# Patient Record
Sex: FEMALE | Race: WHITE | NOT HISPANIC OR LATINO | Employment: FULL TIME | URBAN - METROPOLITAN AREA
[De-identification: names, ages, dates, MRNs, and addresses within clinical notes are randomized per-mention and may not be internally consistent; named-entity substitution may affect disease eponyms.]

---

## 2017-08-28 ENCOUNTER — TRANSFERRED RECORDS (OUTPATIENT)
Dept: SURGERY | Facility: CLINIC | Age: 32
End: 2017-08-28

## 2017-12-17 ENCOUNTER — HEALTH MAINTENANCE LETTER (OUTPATIENT)
Age: 32
End: 2017-12-17

## 2018-01-03 ENCOUNTER — OFFICE VISIT (OUTPATIENT)
Dept: SURGERY | Facility: CLINIC | Age: 33
End: 2018-01-03
Payer: COMMERCIAL

## 2018-01-03 VITALS
OXYGEN SATURATION: 97 % | HEIGHT: 66 IN | SYSTOLIC BLOOD PRESSURE: 124 MMHG | RESPIRATION RATE: 16 BRPM | BODY MASS INDEX: 44.2 KG/M2 | WEIGHT: 275 LBS | DIASTOLIC BLOOD PRESSURE: 64 MMHG | HEART RATE: 95 BPM

## 2018-01-03 DIAGNOSIS — E04.1 RIGHT THYROID NODULE: Primary | ICD-10-CM

## 2018-01-03 DIAGNOSIS — E06.3 HASHIMOTO'S THYROIDITIS: Primary | ICD-10-CM

## 2018-01-03 PROCEDURE — 99203 OFFICE O/P NEW LOW 30 MIN: CPT | Performed by: SURGERY

## 2018-01-03 ASSESSMENT — ENCOUNTER SYMPTOMS: WEIGHT GAIN: 1

## 2018-01-03 NOTE — PROGRESS NOTES
"History of Present Illness  Daxa Wilkes is a 32 year old female self-referred for evaluation  regarding her Hashimoto's thyroiditis and fluctuating thyroid function.  Daxa has had thyroid issues for 5-7 years.  Initially hypothyroid and started on Levothyroxine.  Levels have been fluctuating and dose adjusted every 6 months.  Is now on 100 mcg daily.  Has never had imaging.  She feels poorly informed as to her thyroid issue.    She has symptoms of weight gain, dry skin, feeling down, weight loss and infertility issues.    She reports pressure neck discomfort.  She denies swallowing difficulty and hoarseness.    She does not have a family history of thyroid cancer.  She denies a personal history of radiation exposure.    Daxa is on thyroid medications.  Thyroid medications include: Synthroid (Levothyroxine).  Daxa has no prior neck surgery..      Past Medical History:   Diagnosis Date     Depressive disorder      GERD (gastroesophageal reflux disease)      HELLP syndrome, delivered, current hospitalization 7/13/2016     PUD (peptic ulcer disease)      Thyroid disease     hypo     Vaginal delivery 7/11/2016     History reviewed. No pertinent surgical history.    Smoking History:  has never smoked.    Review Of Systems:    Thyroid issue and weight gain.    Physical Exam:  /64  Pulse 95  Resp 16  Ht 5' 6\" (1.676 m)  Wt 275 lb (124.7 kg)  SpO2 97%  BMI 44.39 kg/m2  Well developed, well nourished female in no apparent distress.  HEENT:  Normocephalic, atraumatic.                   Eyes without exophthalmos.  Neck:   thick and symetrical.              Range of motion is normal.              No neck masses.  Thyroid:  Mildly enlarged, right has a subtle nodule, mid-portion.  Lymph:  No cervical adenopathy.  Respirations:  Unlabored.  Neurologic:  Alert.  Speech is clear.  Moves all extremities with good strength  Skin:  Warm and dry.  Psychologic:  Alert and appropriate range of emotions.    Imaging:  " None  Labs:  From early 2017, Normal free thyroxine, TSH is 4.58-5.53, Thyroid peroxidase 674.2 (H), Thyroglobulin Ab 4.7 (H), TSI 4.61 (H)  FNA Biopsy: NA    Assessment and Plan:    Daxa has Hashimoto's thyroiditis and a somewhat enlarged and asymmetrical gland.  Advised Daxa that Hashimoto's is usually a medical management issue, but some do come to surgery, usually for malignancy concerns.  I am recommending her for thyroid ultrasound for possible right thyroid nodule.  Could require biopsy if nodule seen and clinically indicated.  Will call her with ultrasound result and make the next plan.    Meeta Hilton MD  Please route to : Primary Care Provider (PCP)         35 minutes spent with patient, over half as counseling.

## 2018-01-03 NOTE — LETTER
"January 3, 2018    Re: Daxa Wilkes - 1985    Daxa Wilkes is a 32 year old female self-referred for evaluation  regarding her Hashimoto's thyroiditis and fluctuating thyroid function.  Daxa has had thyroid issues for 5-7 years.  Initially hypothyroid and started on Levothyroxine.  Levels have been fluctuating and dose adjusted every 6 months.  Is now on 100 mcg daily.  Has never had imaging.  She feels poorly informed as to her thyroid issue.     She has symptoms of weight gain, dry skin, feeling down, weight loss and infertility issues.     She reports pressure neck discomfort.  She denies swallowing difficulty and hoarseness.     She does not have a family history of thyroid cancer.  She denies a personal history of radiation exposure.     Daxa is on thyroid medications.  Thyroid medications include: Synthroid (Levothyroxine).  Daxa has no prior neck surgery..      Smoking History:  has never smoked.     Review Of Systems:    Thyroid issue and weight gain.     Physical Exam:  /64  Pulse 95  Resp 16  Ht 5' 6\" (1.676 m)  Wt 275 lb (124.7 kg)  SpO2 97%  BMI 44.39 kg/m2  Well developed, well nourished female in no apparent distress.  HEENT:  Normocephalic, atraumatic.                   Eyes without exophthalmos.  Neck:   thick and symetrical.              Range of motion is normal.              No neck masses.  Thyroid:  Mildly enlarged, right has a subtle nodule, mid-portion.  Lymph:  No cervical adenopathy.  Respirations:  Unlabored.  Neurologic:  Alert.  Speech is clear.  Moves all extremities with good strength  Skin:  Warm and dry.  Psychologic:  Alert and appropriate range of emotions.     Imaging:  None  Labs:  From early 2017, Normal free thyroxine, TSH is 4.58-5.53, Thyroid peroxidase 674.2 (H), Thyroglobulin Ab 4.7 (H), TSI 4.61 (H)  FNA Biopsy: NA     Assessment and Plan:    Daxa has Hashimoto's thyroiditis and a somewhat enlarged and asymmetrical gland.  Advised Daxa " that Hashimoto's is usually a medical management issue, but some do come to surgery, usually for malignancy concerns.  I am recommending her for thyroid ultrasound for possible right thyroid nodule.  Could require biopsy if nodule seen and clinically indicated.  Will call her with ultrasound result and make the next plan.     Meeta Hilton MD

## 2018-01-03 NOTE — PROGRESS NOTES
HPI      ROS (Review of Systems):      Positive for weight gain and thyroid problem.          Physical Exam

## 2018-01-03 NOTE — MR AVS SNAPSHOT
After Visit Summary   1/3/2018    Daxa Wilkes    MRN: [id number]           Patient Information     Date Of Birth          1985        Visit Information        Provider Department      1/3/2018 10:45 AM Meeta Hilton MD [facility] General Surgery       Follow-ups after your visit        Your next 10 appointments already scheduled     Jan 04, 2018 10:30 AM CST   US THYROID with SHUS1   [facility] Ultrasound ([facility])    [address] [facility] [id number] [phone]           Please bring a list of your medicines (including vitamins, minerals and over-the-counter drugs). Also, tell your doctor about any allergies you may have. Wear comfortable clothes and leave your valuables at home.  You do not need to do anything special to prepare for your exam.  Please call the Imaging Department at your exam site with any questions.            Jan 17, 2018  4:00 PM CST   New Visit with Marcy Nicholas MD   [facility] ([facility]    [address]  [facility] [id number] [phone]              Future tests that were ordered for you today     Open Future Orders        Priority Expected Expires Ordered    US Thyroid Routine 1/3/2018 1/3/2019 1/3/2018            Who to contact     If you have questions or need follow up information about today's clinic visit or your schedule please contact [facility] directly at [phone].  Normal or non-critical lab and imaging results will be communicated to you by MyChart, letter or phone within 4 business days after the clinic has received the results. If you do not hear from us within 7 days, please contact the clinic through MyChart or phone. If you have a critical or abnormal lab result, we will notify you by phone as soon as possible.  Submit refill requests through [facility] or call your  "pharmacy and they will forward the refill request to us. Please allow 3 business days for your refill to be completed.          Additional Information About Your Visit        MyChart Information     [employer] lets you send messages to your doctor, view your test results, renew your prescriptions, schedule appointments and more. To sign up, go to [email or web address] . Click on \"Log in\" on the left side of the screen, which will take you to the Welcome page. Then click on \"Sign up Now\" on the right side of the page.     You will be asked to enter the access code listed below, as well as some personal information. Please follow the directions to create your username and password.     Your access code is: [email or web address]  Expires: 4/3/2018 11:12 AM     Your access code will  in 90 days. If you need help or a new code, please call your [facility] clinic or [phone].        Care EveryWhere ID     This is your Care EveryWhere ID. This could be used by other organizations to access your [facility] medical records  [id number]        Your Vitals Were     Pulse Respirations Height Pulse Oximetry BMI (Body Mass Index)       95 16 5' 6\" (1.676 m) 97% 44.39 kg/m2        Blood Pressure from Last 3 Encounters:   18 124/64   16 139/81   16 116/71    Weight from Last 3 Encounters:   18 275 lb (124.7 kg)   11/27/15 230 lb (104.3 kg)   02/22/10 174 lb (78.9 kg)              Today, you had the following     No orders found for display       Primary Care Provider Office Phone # Fax #    Jennifer West PA-C [phone]       [address] [place] [address] [address]  [facility] [address]        Equal Access to Services     [facility] [place] BEST : Gaby Kang, waeladia verdin, qaybta kaalmanic aponte, sheri nicholas. [facility] [phone].    ATENCIÓN: Si habla español, tiene a resendez disposición servicios gratuitos de asistencia lingüística. Llame al " [phone].    We comply with applicable federal civil rights laws and [place] laws. We do not discriminate on the basis of race, color, national origin, age, disability, sex, sexual orientation, or gender identity.            Thank you!     Thank you for choosing [facility]  for your care. Our goal is always to provide you with excellent care. Hearing back from our patients is one way we can continue to improve our services. Please take a few minutes to complete the written survey that you may receive in the mail after your visit with us. Thank you!             Your Updated Medication List - Protect others around you: Learn how to safely use, store and throw away your medicines at www.disposemymeds.org.          This list is accurate as of: 1/3/18 11:12 AM.  Always use your most recent med list.                   Brand Name Dispense Instructions for use Diagnosis    albuterol 108 (90 BASE) MCG/ACT Inhaler    PROAIR HFA    1 Inhaler    Inhale 2 puffs into the lungs every 4 hours as needed for shortness of breath / dyspnea        CITALOPRAM HYDROBROMIDE PO      Take 40 mg by mouth daily        IRON SUPPLEMENT PO      Take 10 mg by mouth daily (with dinner)        levothyroxine 25 mcg/mL Susp    SYNTHROID     Take 50 mcg by mouth every morning (before breakfast)        prenatal multivitamin plus iron 27-0.8 MG Tabs per tablet      Take 1 tablet by mouth daily        RANITIDINE HCL PO      Take 150 mg by mouth 2 times daily

## 2018-01-04 ENCOUNTER — HOSPITAL ENCOUNTER (OUTPATIENT)
Dept: ULTRASOUND IMAGING | Facility: CLINIC | Age: 33
Discharge: HOME | End: 2018-01-04
Attending: SURGERY | Admitting: SURGERY
Payer: COMMERCIAL

## 2018-01-04 DIAGNOSIS — E04.1 RIGHT THYROID NODULE: ICD-10-CM

## 2018-01-04 PROCEDURE — 76536 US EXAM OF HEAD AND NECK: CPT

## 2018-01-09 ENCOUNTER — TELEPHONE (OUTPATIENT)
Dept: SURGERY | Facility: CLINIC | Age: 33
End: 2018-01-09

## 2018-01-09 NOTE — TELEPHONE ENCOUNTER
GENERAL SURGERY NURSE PHONE TRIAGE   Daxa Wilkes    MRN# [id number]  AGE:  32 year old  YOB: 1985  [phone]      Surgeon: Dr. Hilton  Thyroid consult 1/3/2018       CHIEF CONCERN:  Calling for  Thyroid US results     PLAN     Will message Dr. Hilton who will be in clinic tomorrow am (1/10/18)  Patient phone [phone] Okay to leave  with medical information.  Beata Ham RN

## 2019-07-25 ENCOUNTER — HOSPITAL ENCOUNTER (EMERGENCY)
Facility: CLINIC | Age: 34
Discharge: HOME | End: 2019-07-25
Attending: NURSE PRACTITIONER | Admitting: NURSE PRACTITIONER
Payer: COMMERCIAL

## 2019-07-25 VITALS
WEIGHT: 253 LBS | BODY MASS INDEX: 40.84 KG/M2 | SYSTOLIC BLOOD PRESSURE: 130 MMHG | HEART RATE: 129 BPM | TEMPERATURE: 98.4 F | RESPIRATION RATE: 18 BRPM | DIASTOLIC BLOOD PRESSURE: 75 MMHG | OXYGEN SATURATION: 96 %

## 2019-07-25 DIAGNOSIS — L02.91 ABSCESS: ICD-10-CM

## 2019-07-25 PROCEDURE — 99283 EMERGENCY DEPT VISIT LOW MDM: CPT | Mod: 25

## 2019-07-25 PROCEDURE — 10060 I&D ABSCESS SIMPLE/SINGLE: CPT

## 2019-07-25 RX ORDER — SULFAMETHOXAZOLE/TRIMETHOPRIM 800-160 MG
1 TABLET ORAL 2 TIMES DAILY
Qty: 20 TABLET | Refills: 0 | Status: ON HOLD | OUTPATIENT
Start: 2019-07-25 | End: 2019-07-30

## 2019-07-25 RX ORDER — LIDOCAINE HYDROCHLORIDE AND EPINEPHRINE 10; 10 MG/ML; UG/ML
INJECTION, SOLUTION INFILTRATION; PERINEURAL
Status: DISCONTINUED
Start: 2019-07-25 | End: 2019-07-26 | Stop reason: HOSPADM

## 2019-07-25 RX ORDER — BUPIVACAINE HYDROCHLORIDE 5 MG/ML
INJECTION, SOLUTION PERINEURAL
Status: DISCONTINUED
Start: 2019-07-25 | End: 2019-07-26 | Stop reason: HOSPADM

## 2019-07-25 ASSESSMENT — ENCOUNTER SYMPTOMS
FEVER: 0
NAUSEA: 0
DIZZINESS: 0
VOMITING: 0
WOUND: 1
CHILLS: 0

## 2019-07-25 NOTE — ED AVS SNAPSHOT
[facility] Emergency Department  [address]  [facility] [address]  Phone:  [phone]  Fax:  [phone]                                    Daxa Wilkes   MRN: [id number]    Department:  [facility] Emergency Department   Date of Visit:  7/25/2019           After Visit Summary Signature Page    I have received my discharge instructions, and my questions have been answered. I have discussed any challenges I see with this plan with the nurse or doctor.    ..........................................................................................................................................  Patient/Patient Representative Signature      ..........................................................................................................................................  Patient Representative Print Name and Relationship to Patient    ..................................................               ................................................  Date                                   Time    ..........................................................................................................................................  Reviewed by Signature/Title    ...................................................              ..............................................  Date                                               Time          22EPIC Rev 08/18

## 2019-07-26 NOTE — ED PROVIDER NOTES
History     Chief Complaint:  Wound Check    HPI   Daxa Wilkes is a 33 year old female who presents to the emergency department today for evaluation of a wound. The patient reports that she noted a red and painful area to the posterior right shoulder this morning. As her symptoms worsened throughout the day unrelieved with heat, she presents tonight for evaluation and treatment. Here the patient states that she is unsure of whether she could have obtained an insect bite. She denies any fever, chills, dizziness, nausea, or vomiting.      Allergies:  Penicillin g      Medications:    Albuterol  Citalopram  Synthroid  Iron  Ranitidine  Mirena  Effexor  Zoloft    Past Medical History:    HELLP syndrome  Gastroesophageal reflux disease  Peptic ulcer disease  Thyroid disease  Generalized anxiety disorder  Major depressive disorder  Heartburn  Irritable bowel syndrome  Pre-eclampsia    Past Surgical History:    [place] teeth extraction  Cyst removal  Laparoscopy  Knee surgery    Family History:    Father: thyroid disease, depression  Mother: anxiety  Brother: depression    Social History:  Smoking Status: Never Smoker  Smokeless Tobacco: Never Used  Alcohol Use: Negative  Drug Use: Negative  PCP: Jennifer West  Marital Status:        Review of Systems   Constitutional: Negative for chills and fever.   Gastrointestinal: Negative for nausea and vomiting.   Skin: Positive for wound.   Neurological: Negative for dizziness.   All other systems reviewed and are negative.      Physical Exam     Patient Vitals for the past 24 hrs:   BP Temp Temp src Pulse Heart Rate Resp SpO2 Weight   07/25/19 2241 130/75 -- -- -- -- -- 96 % --   07/25/19 2240 -- -- -- -- -- -- 96 % --   07/25/19 2100 (!) 134/91 -- -- -- -- -- 95 % --   07/25/19 2057 (!) 134/91 98.4  F (36.9  C) Oral -- 124 18 96 % --   07/25/19 2054 (!) 134/91 98.4  F (36.9  C) Oral 129 129 16 99 % 114.8 kg (253 lb)     Physical Exam  General: Alert, Mild  discomfort,  well kept, obese  HENT:  Normal voice, No lymphadenopathy  Eyes:  The pupils are equal, round, and reactive to light, Conjunctiva normal, No scleral icterus   Neck:  Normal range of motion  CV:  Normal Pulses  Resp:  Non-labored, No cough  MS:  Normal muscular tone, moves all extremities  Skin:  2 cm diameter area of tenderness and mild erythema, with a central area consistent with either recent drainage or excoriation.  Neuro: Speech is normal and fluent  Psych:  Awake. Alert.  Normal affect.  Appropriate interactions. Good eye contact      Emergency Department Course     Procedures:       Procedure: Incision and Drainage   LOCATIONS:  Posterior right shoulder     ANESTHESIA:  Local field block using Lidocaine 1% with epinephrine and Marcaine 0.5% plain, total of 5 mLs     PREPARATION:  Cleansed with none.     PROCEDURE:  Area was incised with # 15 Blade (Curved Point) with a Single Straight incision.  Wound treatment included Purulent Drainage.  Packing consisted of No Packing.  Appropriate dressing was applied to cover the area.    Patient Status:  Patient tolerated the procedure well. There were no complications.    Emergency Department Course:    2102 Nursing notes and vitals reviewed.    2112 I performed an exam of the patient as documented above.     2225 I performed the incision and drainage procedure as documented above.    2247 Findings and plan explained to the Patient. Patient discharged home with instructions regarding supportive care, medications, and reasons to return. The importance of close follow-up was reviewed. The patient was prescribed Bactrim.    Impression & Plan      Medical Decision Making:  Daxa Wilkes is a 33 year old female has signs of an abscess. I&D performed and successfully expressed purulent drainage; see above procedure note. No signs of serious infection like necrotizing fasciitis or rapid cellulitis given fever curve, no surrounding erythema over past 24 hours, no crepitus  to tissues, no sensation change to tissues. Will need warm compress or soaks 4 times daily x 3-5 days. Discharged home with plan to f/u with surgery or primary; may return to ED for wound check if cannot arrange. Bactrim prescribed for home. Warning signs for worsening infection and reasons to return to the ED discussed and on discharge instructions.    Diagnosis:    ICD-10-CM    1. Abscess L02.91      Disposition:   The patient is discharged to home.    Discharge Medications:     START taking      Dose / Directions   sulfamethoxazole-trimethoprim 800-160 MG tablet  Commonly known as:  BACTRIM DS      Dose:  1 tablet  Take 1 tablet by mouth 2 times daily for 10 days  Quantity:  20 tablet  Refills:  0           Where to get your medicines      Some of these will need a paper prescription and others can be bought over the counter. Ask your nurse if you have questions.    Bring a paper prescription for each of these medications    sulfamethoxazole-trimethoprim 800-160 MG tablet       Scribe Disclosure:  I, Gillian Griffiths, am serving as a scribe at 9:13 PM on 7/25/2019 to document services personally performed by Garret Dunn APRN based on my observations and the provider's statements to me.    [facility] EMERGENCY DEPARTMENT       Garret Dunn APRN CNP  07/25/19 3672

## 2019-07-26 NOTE — DISCHARGE INSTRUCTIONS
Warm pack for 15 min 3-5 times daily. The easiest way to do this is take a wash cloth, wet it, place in open ziplock bag and put in microwave.  When  It comes out it will be very, very hot.  Close bag.  Wrap it with another washcloth and then place over area. If given antibiotics finish the entire course

## 2019-07-26 NOTE — ED TRIAGE NOTES
Pt A&O x4. ABCs intact. Pt has small closed wound on right shoulder. Pt rates severe pain when area near the wound is touched. Pt denies any medication taken

## 2019-07-28 ENCOUNTER — HOSPITAL ENCOUNTER (INPATIENT)
Facility: CLINIC | Age: 34
LOS: 1 days | Discharge: HOME | DRG: 572 | End: 2019-07-30
Attending: EMERGENCY MEDICINE | Admitting: INTERNAL MEDICINE
Payer: COMMERCIAL

## 2019-07-28 DIAGNOSIS — L03.113 CELLULITIS OF RIGHT UPPER EXTREMITY: Primary | ICD-10-CM

## 2019-07-28 DIAGNOSIS — L03.312 CELLULITIS OF BACK: ICD-10-CM

## 2019-07-28 DIAGNOSIS — Z78.9 FAILURE OF OUTPATIENT TREATMENT: ICD-10-CM

## 2019-07-28 DIAGNOSIS — L02.419 SHOULDER ABSCESS: ICD-10-CM

## 2019-07-28 PROBLEM — L03.90 CELLULITIS: Status: ACTIVE | Noted: 2019-07-28

## 2019-07-28 LAB
ALBUMIN UR-MCNC: 10 MG/DL
ANION GAP SERPL CALCULATED.3IONS-SCNC: 5 MMOL/L (ref 3–14)
APPEARANCE UR: ABNORMAL
B-HCG FREE SERPL-ACNC: <5 IU/L
BASOPHILS # BLD AUTO: 0 10E9/L (ref 0–0.2)
BASOPHILS NFR BLD AUTO: 0.3 %
BILIRUB UR QL STRIP: NEGATIVE
BUN SERPL-MCNC: 9 MG/DL (ref 7–30)
CALCIUM SERPL-MCNC: 8.8 MG/DL (ref 8.5–10.1)
CHLORIDE SERPL-SCNC: 107 MMOL/L (ref 94–109)
CO2 BLDCOV-SCNC: 22 MMOL/L (ref 21–28)
CO2 SERPL-SCNC: 24 MMOL/L (ref 20–32)
COLOR UR AUTO: YELLOW
CREAT SERPL-MCNC: 0.64 MG/DL (ref 0.52–1.04)
CRP SERPL-MCNC: 83 MG/L (ref 0–8)
DIFFERENTIAL METHOD BLD: NORMAL
EOSINOPHIL # BLD AUTO: 0 10E9/L (ref 0–0.7)
EOSINOPHIL NFR BLD AUTO: 0 %
ERYTHROCYTE [DISTWIDTH] IN BLOOD BY AUTOMATED COUNT: 14.4 % (ref 10–15)
GFR SERPL CREATININE-BSD FRML MDRD: >90 ML/MIN/{1.73_M2}
GLUCOSE SERPL-MCNC: 134 MG/DL (ref 70–99)
GLUCOSE UR STRIP-MCNC: NEGATIVE MG/DL
GRAM STN SPEC: ABNORMAL
HCT VFR BLD AUTO: 43.9 % (ref 35–47)
HGB BLD-MCNC: 14.5 G/DL (ref 11.7–15.7)
HGB UR QL STRIP: NEGATIVE
IMM GRANULOCYTES # BLD: 0 10E9/L (ref 0–0.4)
IMM GRANULOCYTES NFR BLD: 0.1 %
KETONES UR STRIP-MCNC: NEGATIVE MG/DL
LACTATE BLD-SCNC: 0.6 MMOL/L (ref 0.7–2.1)
LEUKOCYTE ESTERASE UR QL STRIP: ABNORMAL
LYMPHOCYTES # BLD AUTO: 2.5 10E9/L (ref 0.8–5.3)
LYMPHOCYTES NFR BLD AUTO: 35.3 %
Lab: ABNORMAL
MCH RBC QN AUTO: 31.3 PG (ref 26.5–33)
MCHC RBC AUTO-ENTMCNC: 33 G/DL (ref 31.5–36.5)
MCV RBC AUTO: 95 FL (ref 78–100)
MONOCYTES # BLD AUTO: 0.5 10E9/L (ref 0–1.3)
MONOCYTES NFR BLD AUTO: 7.3 %
MUCOUS THREADS #/AREA URNS LPF: PRESENT /LPF
NEUTROPHILS # BLD AUTO: 4 10E9/L (ref 1.6–8.3)
NEUTROPHILS NFR BLD AUTO: 57 %
NITRATE UR QL: NEGATIVE
NRBC # BLD AUTO: 0 10*3/UL
NRBC BLD AUTO-RTO: 0 /100
PCO2 BLDV: 38 MM HG (ref 40–50)
PH BLDV: 7.38 PH (ref 7.32–7.43)
PH UR STRIP: 6.5 PH (ref 5–7)
PLATELET # BLD AUTO: 169 10E9/L (ref 150–450)
PO2 BLDV: 37 MM HG (ref 25–47)
POTASSIUM SERPL-SCNC: 3.7 MMOL/L (ref 3.4–5.3)
PROCALCITONIN SERPL-MCNC: <0.05 NG/ML
RBC # BLD AUTO: 4.63 10E12/L (ref 3.8–5.2)
RBC #/AREA URNS AUTO: 3 /HPF (ref 0–2)
SAO2 % BLDV FROM PO2: 69 %
SODIUM SERPL-SCNC: 136 MMOL/L (ref 133–144)
SOURCE: ABNORMAL
SP GR UR STRIP: 1.02 (ref 1–1.03)
SPECIMEN SOURCE: ABNORMAL
SQUAMOUS #/AREA URNS AUTO: 8 /HPF (ref 0–1)
UROBILINOGEN UR STRIP-MCNC: NORMAL MG/DL (ref 0–2)
WBC # BLD AUTO: 7 10E9/L (ref 4–11)
WBC #/AREA URNS AUTO: 5 /HPF (ref 0–5)

## 2019-07-28 PROCEDURE — 82803 BLOOD GASES ANY COMBINATION: CPT

## 2019-07-28 PROCEDURE — 25000128 H RX IP 250 OP 636: Performed by: EMERGENCY MEDICINE

## 2019-07-28 PROCEDURE — 81001 URINALYSIS AUTO W/SCOPE: CPT | Performed by: EMERGENCY MEDICINE

## 2019-07-28 PROCEDURE — 25800030 ZZH RX IP 258 OP 636

## 2019-07-28 PROCEDURE — 25000132 ZZH RX MED GY IP 250 OP 250 PS 637: Performed by: EMERGENCY MEDICINE

## 2019-07-28 PROCEDURE — 85025 COMPLETE CBC W/AUTO DIFF WBC: CPT | Performed by: EMERGENCY MEDICINE

## 2019-07-28 PROCEDURE — 99285 EMERGENCY DEPT VISIT HI MDM: CPT | Mod: 25

## 2019-07-28 PROCEDURE — 76604 US EXAM CHEST: CPT

## 2019-07-28 PROCEDURE — G0378 HOSPITAL OBSERVATION PER HR: HCPCS

## 2019-07-28 PROCEDURE — 96361 HYDRATE IV INFUSION ADD-ON: CPT

## 2019-07-28 PROCEDURE — 86140 C-REACTIVE PROTEIN: CPT | Performed by: EMERGENCY MEDICINE

## 2019-07-28 PROCEDURE — 84702 CHORIONIC GONADOTROPIN TEST: CPT

## 2019-07-28 PROCEDURE — 87077 CULTURE AEROBIC IDENTIFY: CPT | Performed by: EMERGENCY MEDICINE

## 2019-07-28 PROCEDURE — 87070 CULTURE OTHR SPECIMN AEROBIC: CPT | Performed by: EMERGENCY MEDICINE

## 2019-07-28 PROCEDURE — 99219 ZZC INITIAL OBSERVATION CARE,LEVL II: CPT | Performed by: PHYSICIAN ASSISTANT

## 2019-07-28 PROCEDURE — 84145 PROCALCITONIN (PCT): CPT | Performed by: EMERGENCY MEDICINE

## 2019-07-28 PROCEDURE — 87040 BLOOD CULTURE FOR BACTERIA: CPT | Performed by: EMERGENCY MEDICINE

## 2019-07-28 PROCEDURE — 93005 ELECTROCARDIOGRAM TRACING: CPT

## 2019-07-28 PROCEDURE — 36415 COLL VENOUS BLD VENIPUNCTURE: CPT | Performed by: EMERGENCY MEDICINE

## 2019-07-28 PROCEDURE — 83605 ASSAY OF LACTIC ACID: CPT

## 2019-07-28 PROCEDURE — 87205 SMEAR GRAM STAIN: CPT | Performed by: EMERGENCY MEDICINE

## 2019-07-28 PROCEDURE — 96374 THER/PROPH/DIAG INJ IV PUSH: CPT

## 2019-07-28 PROCEDURE — 87186 SC STD MICRODIL/AGAR DIL: CPT | Performed by: EMERGENCY MEDICINE

## 2019-07-28 PROCEDURE — 96367 TX/PROPH/DG ADDL SEQ IV INF: CPT

## 2019-07-28 PROCEDURE — 25000128 H RX IP 250 OP 636

## 2019-07-28 PROCEDURE — 80048 BASIC METABOLIC PNL TOTAL CA: CPT | Performed by: EMERGENCY MEDICINE

## 2019-07-28 PROCEDURE — 25000125 ZZHC RX 250: Performed by: PHYSICIAN ASSISTANT

## 2019-07-28 PROCEDURE — 96375 TX/PRO/DX INJ NEW DRUG ADDON: CPT

## 2019-07-28 PROCEDURE — 25000132 ZZH RX MED GY IP 250 OP 250 PS 637: Performed by: PHYSICIAN ASSISTANT

## 2019-07-28 RX ORDER — AMOXICILLIN 250 MG
2 CAPSULE ORAL 2 TIMES DAILY PRN
Status: DISCONTINUED | OUTPATIENT
Start: 2019-07-28 | End: 2019-07-30 | Stop reason: HOSPADM

## 2019-07-28 RX ORDER — KETOROLAC TROMETHAMINE 15 MG/ML
15 INJECTION, SOLUTION INTRAMUSCULAR; INTRAVENOUS ONCE
Status: COMPLETED | OUTPATIENT
Start: 2019-07-28 | End: 2019-07-28

## 2019-07-28 RX ORDER — BUPROPION HYDROCHLORIDE 150 MG/1
150 TABLET ORAL EVERY MORNING
Status: DISCONTINUED | OUTPATIENT
Start: 2019-07-29 | End: 2019-07-30 | Stop reason: HOSPADM

## 2019-07-28 RX ORDER — TRAMADOL HYDROCHLORIDE 50 MG/1
50 TABLET ORAL EVERY 6 HOURS PRN
Status: DISCONTINUED | OUTPATIENT
Start: 2019-07-28 | End: 2019-07-30 | Stop reason: HOSPADM

## 2019-07-28 RX ORDER — AMOXICILLIN 250 MG
1 CAPSULE ORAL 2 TIMES DAILY PRN
Status: DISCONTINUED | OUTPATIENT
Start: 2019-07-28 | End: 2019-07-30 | Stop reason: HOSPADM

## 2019-07-28 RX ORDER — BUPROPION HYDROCHLORIDE 150 MG/1
150 TABLET ORAL EVERY MORNING
COMMUNITY

## 2019-07-28 RX ORDER — LEVOTHYROXINE SODIUM 112 UG/1
112 TABLET ORAL DAILY
Status: DISCONTINUED | OUTPATIENT
Start: 2019-07-28 | End: 2019-07-30 | Stop reason: HOSPADM

## 2019-07-28 RX ORDER — ONDANSETRON 2 MG/ML
4 INJECTION INTRAMUSCULAR; INTRAVENOUS EVERY 6 HOURS PRN
Status: DISCONTINUED | OUTPATIENT
Start: 2019-07-28 | End: 2019-07-29

## 2019-07-28 RX ORDER — ONDANSETRON 4 MG/1
4 TABLET, ORALLY DISINTEGRATING ORAL EVERY 6 HOURS PRN
Status: DISCONTINUED | OUTPATIENT
Start: 2019-07-28 | End: 2019-07-29

## 2019-07-28 RX ORDER — LIDOCAINE 40 MG/G
CREAM TOPICAL
Status: DISCONTINUED | OUTPATIENT
Start: 2019-07-28 | End: 2019-07-30

## 2019-07-28 RX ORDER — ACETAMINOPHEN 325 MG/1
650 TABLET ORAL EVERY 4 HOURS PRN
Status: DISCONTINUED | OUTPATIENT
Start: 2019-07-28 | End: 2019-07-30 | Stop reason: HOSPADM

## 2019-07-28 RX ORDER — CEFAZOLIN SODIUM 1 G/50ML
1750 SOLUTION INTRAVENOUS ONCE
Status: COMPLETED | OUTPATIENT
Start: 2019-07-28 | End: 2019-07-28

## 2019-07-28 RX ORDER — NALOXONE HYDROCHLORIDE 0.4 MG/ML
.1-.4 INJECTION, SOLUTION INTRAMUSCULAR; INTRAVENOUS; SUBCUTANEOUS
Status: DISCONTINUED | OUTPATIENT
Start: 2019-07-28 | End: 2019-07-29

## 2019-07-28 RX ORDER — CLINDAMYCIN PHOSPHATE 900 MG/50ML
900 INJECTION, SOLUTION INTRAVENOUS EVERY 8 HOURS
Status: DISCONTINUED | OUTPATIENT
Start: 2019-07-28 | End: 2019-07-30

## 2019-07-28 RX ORDER — ACETAMINOPHEN 325 MG/1
650 TABLET ORAL ONCE
Status: COMPLETED | OUTPATIENT
Start: 2019-07-28 | End: 2019-07-28

## 2019-07-28 RX ORDER — LEVOTHYROXINE SODIUM 112 UG/1
112 TABLET ORAL DAILY
COMMUNITY

## 2019-07-28 RX ORDER — IBUPROFEN 200 MG
200 TABLET ORAL EVERY 6 HOURS PRN
Status: ON HOLD | COMMUNITY
End: 2019-09-20

## 2019-07-28 RX ORDER — IBUPROFEN 600 MG/1
600 TABLET, FILM COATED ORAL EVERY 6 HOURS PRN
Status: DISCONTINUED | OUTPATIENT
Start: 2019-07-28 | End: 2019-07-30 | Stop reason: HOSPADM

## 2019-07-28 RX ADMIN — TRAMADOL HYDROCHLORIDE 50 MG: 50 TABLET, FILM COATED ORAL at 16:59

## 2019-07-28 RX ADMIN — ACETAMINOPHEN 650 MG: 325 TABLET, FILM COATED ORAL at 13:39

## 2019-07-28 RX ADMIN — SODIUM CHLORIDE 1000 ML: 9 INJECTION, SOLUTION INTRAVENOUS at 11:59

## 2019-07-28 RX ADMIN — VANCOMYCIN HYDROCHLORIDE 1750 MG: 10 INJECTION, POWDER, LYOPHILIZED, FOR SOLUTION INTRAVENOUS at 12:38

## 2019-07-28 RX ADMIN — CLINDAMYCIN PHOSPHATE 900 MG: 900 INJECTION, SOLUTION INTRAVENOUS at 21:20

## 2019-07-28 RX ADMIN — LEVOTHYROXINE SODIUM 112 MCG: 112 TABLET ORAL at 16:59

## 2019-07-28 RX ADMIN — KETOROLAC TROMETHAMINE 15 MG: 15 INJECTION, SOLUTION INTRAMUSCULAR; INTRAVENOUS at 13:39

## 2019-07-28 RX ADMIN — SODIUM CHLORIDE 1000 ML: 9 INJECTION, SOLUTION INTRAVENOUS at 13:44

## 2019-07-28 RX ADMIN — TRAMADOL HYDROCHLORIDE 50 MG: 50 TABLET, FILM COATED ORAL at 23:40

## 2019-07-28 ASSESSMENT — ENCOUNTER SYMPTOMS
CHILLS: 1
COUGH: 0
VOMITING: 0
WOUND: 1
FEVER: 1

## 2019-07-28 ASSESSMENT — MIFFLIN-ST. JEOR: SCORE: 1869.34

## 2019-07-28 NOTE — H&P
[facility] Outpatient / Observation Unit  History and Physical Exam     Daxa Wilkes MRN# [id number]   YOB: 1985 Age: 33 year old      Date of Admission:  7/28/2019    Primary care provider: Jennifer West          Assessment:   Daxa Wilkes is a 33 year old female with a PMH significant for hypothyroidism, GERD and depression, who presents with worsening pain and induration of the right shoulder at the site of previous abscess drainage on 7/25 and fever.  Work up in the ED reveals: low grade temp of 100, mildly tachycardic at 117. VS otherwise unremarkable. BMP and CBC are unremarkable. CRP is elevated at 83.0, lactic acid is normal. Blood and wound cultures were taken in the ED.  POC US was done and reported as negative for recurrent abscess. She was given 1L NS bolus x2, 650 mg PO Tylenol, 15 mg IV Toradol and IV Vanco.   Patient will be registered to Observation for further evaluation and management.     1. Acute R shoulder wound and cellulitis - developed cyst on posterior R shoulder last week, presented to ED on 7/25, abscess was noted and drained in ED. She was started on Bactrim. Since that time, increasing pain and swelling/induration. Labs are fairly unremarkable other than elevated CRP at 83. No recurrent abscess collection per POC US. Given IV Vanco in ED. Will switch to IV Clindamycin and supportive cares. Borders are marked.  2. Hypothyroidism - resume home meds  3. Depression - resume home meds         Plan:     1. [place] to Observation  2. Start antibiotics IV Clindamycin  3. Supportive care with anti-emetics, pain meds PRN  4. Regular diet as tolerated  5. DVT prophylaxis: pt at low risk, encourage ambulation.                    Chief Complaint:   Increasing R posterior shoulder swelling and pain         History of Present Illness:   Daxa Wilkes is a 33 year old female with a PMH significant for hypothyroidism, GERD and depression, who presents with worsening pain and induration  of the right shoulder at the site of previous abscess drainage on 7/25 and fever. Pt presented to the ED on 7/25 to have a cyst evaluated on her posterior R shoulder. She was diagnosed with an abscess and this was lanced and drained in the ED. She was started on Bactrim and discharged home. Since that time, she notes increasing pain and swelling to that area. There is a small area of erythema surrounding the wound otherwise no significant erythema over the area of induration that extends from the wound. She also notes fevers at home, up to 102, and chills and night sweats. She denies chest pain, SOB, abd pain, nausea, vomiting, diarrhea or dysuria. She has not missed any doses of the antibiotic.             Past Medical History:     Past Medical History:   Diagnosis Date     Depressive disorder      GERD (gastroesophageal reflux disease)      HELLP syndrome, delivered, current hospitalization 7/13/2016     PUD (peptic ulcer disease)      Thyroid disease     hypo     Vaginal delivery 7/11/2016               Past Surgical History:   History reviewed. No pertinent surgical history.            Social History:     Social History     Socioeconomic History     Marital status:      Spouse name: Not on file     Number of children: Not on file     Years of education: Not on file     Highest education level: Not on file   Occupational History     Not on file   Social Needs     Financial resource strain: Not on file     Food insecurity:     Worry: Not on file     Inability: Not on file     Transportation needs:     Medical: Not on file     Non-medical: Not on file   Tobacco Use     Smoking status: Never Smoker     Smokeless tobacco: Never Used   Substance and Sexual Activity     Alcohol use: No     Drug use: No     Sexual activity: Not on file   Lifestyle     Physical activity:     Days per week: Not on file     Minutes per session: Not on file     Stress: Not on file   Relationships     Social connections:     Talks on  phone: Not on file     Gets together: Not on file     Attends Anabaptist service: Not on file     Active member of club or organization: Not on file     Attends meetings of clubs or organizations: Not on file     Relationship status: Not on file     Intimate partner violence:     Fear of current or ex partner: Not on file     Emotionally abused: Not on file     Physically abused: Not on file     Forced sexual activity: Not on file   Other Topics Concern     Parent/sibling w/ CABG, MI or angioplasty before 65F 55M? Not Asked   Social History Narrative     Not on file               Family History:     Family History   Problem Relation Age of Onset     Unknown/Adopted Mother      Thyroid Disease Father      Breast Cancer Maternal Grandmother      Thyroid Disease Maternal Grandfather      Thyroid Disease Paternal Grandmother      Thyroid Disease Paternal Grandfather      Thyroid Disease Brother      Thyroid Disease Sister               Allergies:      Allergies   Allergen Reactions     Penicillin G                Medications:     Prior to Admission medications    Medication Sig Last Dose Taking? Auth Provider   albuterol (ALBUTEROL) 108 (90 BASE) MCG/ACT inhaler Inhale 2 puffs into the lungs every 4 hours as needed for shortness of breath / dyspnea   Bouchra Parr MD   CITALOPRAM HYDROBROMIDE PO Take 40 mg by mouth daily    Reported, Patient   Ferrous Sulfate (IRON SUPPLEMENT PO) Take 10 mg by mouth daily (with dinner)   Reported, Patient   levothyroxine (SYNTHROID) 25 mcg/mL Take 50 mcg by mouth every morning (before breakfast)   Reported, Patient   Prenatal Vit-Fe Fumarate-FA (PRENATAL MULTIVITAMIN  PLUS IRON) 27-0.8 MG TABS Take 1 tablet by mouth daily   Reported, Patient   RANITIDINE HCL PO Take 150 mg by mouth 2 times daily   Reported, Patient   sulfamethoxazole-trimethoprim (BACTRIM DS) 800-160 MG tablet Take 1 tablet by mouth 2 times daily for 10 days   Garret Dunn APRN CNP              Review  of Systems:   A Comprehensive greater than 10 system review of systems was carried out.  Pertinent positives and negatives are noted above.  Otherwise negative for contributory information.     Constitutional, neuro, ENT, endocrine, pulmonary, cardiac, gastrointestinal, genitourinary, musculoskeletal, integument and psychiatric systems are negative, except as otherwise noted.         Physical Exam:   Blood pressure 124/76, temperature 100  F (37.8  C), temperature source Rectal, resp. rate 18, SpO2 95 %, not currently breastfeeding.    GENERAL:  Comfortable.  PSYCH: pleasant, oriented, No acute distress.  HEENT:  Atraumatic, normocephalic. Normal conjunctiva, normal hearing, and oropharynx is normal.  NECK:  Supple, no neck vein distention  HEART:  Normal S1, S2 with no murmur, no pericardial rub, gallops or S3 or S4.  LUNGS:  Clear to auscultation, normal Respiratory effort. No wheezing, rales or ronchi.  GI:  Soft, normal bowel sounds. Non-tender, non distended.   EXTREMITIES: R shoulder - small area of erythema surrounding the wound on posterior R shoulder, otherwise no significant erythema over the area of induration that extends from the wound, borders marked.   SKIN:  Dry to touch, No other rash, wound or ulcerations.  NEUROLOGIC:  CN 2-12 intact, BL 5/5 symmetric upper and lower extremity strength, sensation is intact with no focal deficits.              Data:     Recent Labs   Lab 07/28/19  1156   PHV 7.38   PO2V 37   PCO2V 38*   HCO3V 22     Recent Labs   Lab 07/28/19  1144   WBC 7.0   HGB 14.5   HCT 43.9   MCV 95        Recent Labs   Lab 07/28/19  1144      POTASSIUM 3.7   CHLORIDE 107   CO2 24   ANIONGAP 5   *   BUN 9   CR 0.64   GFRESTIMATED >90   GFRESTBLACK >90   SAMIA 8.8     Recent Labs   Lab 07/28/19  1144   CRP 83.0*     Recent Labs   Lab 07/28/19  1156   LACT 0.6*         No results found for this or any previous visit (from the past 48 hour(s)).    Nilda Hill PA-C

## 2019-07-28 NOTE — PLAN OF CARE
ROOM # 203    Living Situation (if not independent, order SW consult): lives with , children, mother in law in a home  Facility name: N/A  : Scout (spouse)    Activity level at baseline: independent  Activity level on admit: independent      Patient registered to observation; given Patient Bill of Rights; given the opportunity to ask questions about observation status and their plan of care.  Patient has been oriented to the observation room, bathroom and call light is in place.    Discussed discharge goals and expectations with patient/family.

## 2019-07-28 NOTE — ED TRIAGE NOTES
Right shoulder wound did have lanced here Thursday sent home on axb. Pt feels it is not improving and is getting worse. Fevers 102 101 through weekend

## 2019-07-28 NOTE — PHARMACY-ADMISSION MEDICATION HISTORY
Admission medication history interview status for this patient is complete. See [facility] admission navigator for allergy information, prior to admission medications and immunization status.     Medication history interview source(s):Patient  Medication history resources (including written lists, pill bottles, clinic record): SureScripts and Care Everywhere  Primary pharmacy: [facility] [address] [place] [address]    Changes made to PTA medication list:  Added: Sertraline, ibuprofen, bupropion, Mirena  Deleted: citalopram, ferrous sulfate, and prenatal vitamin  Changed: Levothyroxine 50 mcg --> 112 mcg. Ranitidine 150 bid --> bid PRN    Actions taken by pharmacist (provider contacted, etc):None     Additional medication history information: None    Medication reconciliation/reorder completed by provider prior to medication history? No    Do you take OTC medications (eg tylenol, ibuprofen, fish oil, eye/ear drops, etc)? Yes, see list below.    Prior to Admission medications    Medication Sig Last Dose Taking? Auth Provider   albuterol (ALBUTEROL) 108 (90 BASE) MCG/ACT inhaler Inhale 2 puffs into the lungs every 4 hours as needed for shortness of breath / dyspnea Past Week at Unknown time Yes Bouchra Parr MD   buPROPion (WELLBUTRIN XL) 150 MG 24 hr tablet Take 150 mg by mouth every morning 7/27/2019 at 0700 Yes Unknown, Entered By History   ibuprofen (ADVIL/MOTRIN) 200 MG tablet Take 200 mg by mouth every 6 hours as needed for mild pain 7/27/2019 at am Yes Unknown, Entered By History   levonorgestrel (MIRENA) 20 MCG/24HR IUD 1 each by Intrauterine route once Placed 3 years ago 7/28/2019 at Unknown time Yes Unknown, Entered By History   levothyroxine (SYNTHROID/LEVOTHROID) 112 MCG tablet Take 112 mcg by mouth daily 7/27/2019 at 0700 Yes Unknown, Entered By History   sertraline (ZOLOFT) 50 MG tablet Take 50 mg by mouth daily 7/27/2019 at 0700 Yes Unknown, Entered By History    sulfamethoxazole-trimethoprim (BACTRIM DS) 800-160 MG tablet Take 1 tablet by mouth 2 times daily for 10 days 7/28/2019 at 0700 Yes Garret Dunn APRN CNP   ranitidine (ZANTAC) 150 MG tablet Take 150 mg by mouth 2 times daily as needed for heartburn  7/26/2019  Reported, Patient

## 2019-07-28 NOTE — ED NOTES
[facility]  ED Nurse Handoff Report    Daxa Wilkes is a 33 year old female   ED Chief complaint: Wound Check and Fever  . ED Diagnosis:   Final diagnoses:   Failure of outpatient treatment   Cellulitis of back     Allergies:   Allergies   Allergen Reactions     Penicillin G        Code Status: Full Code  Activity level - Baseline/Home:  Independent. Activity Level - Current:   Independent. Lift room needed: No. Bariatric: No   Needed: No   Isolation: No. Infection: Not Applicable.     Vital Signs:   Vitals:    07/28/19 1108 07/28/19 1159 07/28/19 1300 07/28/19 1339   BP: 124/76  126/80    Pulse:   113    Resp: 18   18   Temp: 98.2  F (36.8  C) 100  F (37.8  C)     TempSrc: Oral Rectal     SpO2: 95%  96%        Cardiac Rhythm:  ,      Pain level: 0-10 Pain Scale: 8  Patient confused: No. Patient Falls Risk: No.   Elimination Status: Has voided   Patient Report - Initial Complaint: right upper shoulder back wound getting worse. Fever. axb not working . Focused Assessment: right shoulder/back wound black/scabbed in middle swollen and red rectal temp 100 HR tachy   Tests Performed: labs, wound culture . Abnormal Results:   Labs Ordered and Resulted from Time of ED Arrival Up to the Time of Departure from the ED   CRP INFLAMMATION - Abnormal; Notable for the following components:       Result Value    CRP Inflammation 83.0 (*)     All other components within normal limits   BASIC METABOLIC PANEL - Abnormal; Notable for the following components:    Glucose 134 (*)     All other components within normal limits   ISTAT  GASES LACTATE ELIZABETH POCT - Abnormal; Notable for the following components:    PCO2 Venous 38 (*)     Lactic Acid 0.6 (*)     All other components within normal limits   CBC WITH PLATELETS DIFFERENTIAL   PROCALCITONIN   ROUTINE UA WITH MICROSCOPIC   PERIPHERAL IV CATHETER   CARDIAC CONTINUOUS MONITORING   PULSE OXIMETRY NURSING   ISTAT CG4 GASES LACTATE ELIZABETH NURSING POCT   ISTAT HCG  QUANTITATIVE PREGNANCY NURSING POCT   ISTAT HCG QUANTITATIVE PREGNANCY POCT   BLOOD CULTURE   BLOOD CULTURE   WOUND CULTURE AEROBIC BACTERIAL   GRAM STAIN     .   Treatments provided: ivf axb wound culture meds   Family Comments: pt here alone   OBS brochure/video discussed/provided to patient:  N/A  ED Medications:   Medications   vancomycin (VANCOCIN) 1,750 mg in sodium chloride 0.9 % 500 mL intermittent infusion (1,750 mg Intravenous Given 7/28/19 1238)   0.9% sodium chloride BOLUS (1,000 mLs Intravenous New Bag 7/28/19 1344)   0.9% sodium chloride BOLUS (1,000 mLs Intravenous New Bag 7/28/19 1159)   ketorolac (TORADOL) injection 15 mg (15 mg Intravenous Given 7/28/19 1339)   acetaminophen (TYLENOL) tablet 650 mg (650 mg Oral Given 7/28/19 1339)     Drips infusing:  No  For the majority of the shift, the patient's behavior Green. Interventions performed were ivf, pain meds, axb, ivf.     Severe Sepsis OR Septic Shock Diagnosis Present: No      ED Nurse Name/Phone Number: Srikanth Thompson,   1:47 PM  RECEIVING UNIT ED HANDOFF REVIEW    Above ED Nurse Handoff Report was reviewed: Yes  Reviewed by: Kanwal Back on July 28, 2019 at 2:15 PM

## 2019-07-28 NOTE — ED PROVIDER NOTES
History     Chief Complaint:  Wound Check and Fever    The history is provided by the patient.      Daxa Wilkes is a generally healthy 33 year old female who presents for a wound check and evaluation of a fever.  Daxa was seen 07/25/2019 for a right upper back abscess and had I&D in the ER. She was discharged on Bactrim and has been taking it as prescribed. However, since discharge she has felt unwell with chilld and fever with TMax 102.6F. She feels the area is worsening in pain and swelling. She has been using ibuprofen for symptoms with last dose 5 hours prior to arrival. She has had no vomiting or other concerns.    Allergies:  Penicillin G    Medications:    Albuterol   Zantac  Bactrim - see HPI  Zoloft  Wellbutrin XL  Synthroid  Motrin  Mirena    Past Medical History:    Depressive disorder   GERD (gastroesophageal reflux disease)   HELLP syndrome, delivered  PUD (peptic ulcer disease)   Thyroid disease   Vaginal delivery     Past Surgical History:    History reviewed. No pertinent surgical history.    Family History:    Father: Thyroid disease  Maternal Grandmother: Breast cancer  Maternal Grandfather: Thyroid disease  Paternal Grandmother: Thyroid disease  Paternal Grandfather: Thyroid disease  Brother: Thyroid disease  Sister: Thyroid disease    Social History:  Smoking Status: Never Smoker  Smokeless Tobacco: Never Used  Drug use: Negative  Alcohol Use: Negative   Marital Status:     Presents alone.    Review of Systems   Constitutional: Positive for chills and fever.   Respiratory: Negative for cough.    Gastrointestinal: Negative for vomiting.   Skin: Positive for wound.   All other systems reviewed and are negative.    Physical Exam     Patient Vitals for the past 24 hrs:   BP Temp Temp src Pulse Heart Rate Resp SpO2   07/28/19 1339 -- -- -- -- -- 18 --   07/28/19 1300 126/80 -- -- 113 -- -- 96 %   07/28/19 1159 -- 100  F (37.8  C) Rectal -- -- -- --   07/28/19 1108 124/76 98.2  F (36.8   C) Oral -- 117 18 95 %      Physical Exam  General: Well-developed and well-nourished. Well appearing young  woman. Cooperative.  Head:  Atraumatic.  Eyes:  Conjunctivae, lids, and sclerae are normal.  ENT:    Normal nose. Moist mucous membranes.  Neck:  Supple. Normal range of motion.  CV:  Tachycardic rate and regular rhythm. Normal heart sounds with no murmurs, rubs, or gallops detected.  Resp:  No respiratory distress. Clear to auscultation bilaterally without decreased breath sounds, wheezing, rales, or rhonchi.  GI:  Soft. Non-distended. Non-tender.    MS:  Normal ROM.   Skin:  Warm. Non-diaphoretic. No pallor.  As per photos below there is a area of tender induration on the right thoracic back/posterior shoulder with only a small amount of erythema and induration extending much beyond this.  There is no obvious fluctuance with an open wound from prior incision and drainage.  No crepitus.  Neuro:  Awake. A&Ox3. Normal strength.  Psych: Normal mood and affect. Normal speech.  Vitals reviewed.            Emergency Department Course     EKG  Indication: Tachycardia  Time: 1146  Rate 116 bpm. NC interval 136. QRS duration 76. QT/QTc 332/461.   Sinus tachycardia  Otherwise normal ECG   No acute ST changes.  No prior ECG for comparison.    Imaging:  Radiology findings were communicated with the patient who voiced understanding of the findings.    POC US SOFT TISSUE  Final Result  Limited Soft Tissue Ultrasound, performed and interpreted by me.  Indication:  Skin redness warmth pain. Evaluate for cellulitis vs abscess.   Body location: back  Findings:  There is cobblestoning suggestive of cellulitis in the evaluated area. There is no fluid collection to suggest abscess. No foreign body identified  IMPRESSION: Cellulitis.    Laboratory:  Laboratory findings were communicated with the patient who voiced understanding of the findings.    CBC: WBC 7.0, HGB 14.5,   BMP: Glucose 134 (H) o/w WNL (Creatinine  "0.64)  ISTAT gases lactate maureen POCT: pH: 7.38, PCO2: 38 (L), PO2: 37, Bicarbonate: 22, O2 Sat: 69, Lactic acid: 0.6 (L)  ISTAT HCG Quantitative: <5.0  Blood culture: Pending    CRP inflammation: 83 (H)    Wound culture: Pending    UA with micro: Protein albumin 10, Leukocyte esterase large, RBC 3 (H), Squamous Epithelial 8 (H), Mucous present o/w negative     Interventions:  1159 NS 1000 mL IV  1238 Vancomycin 1750 mg IV  650 mg Tylenol PO  15 mg Toradol IV  NS 1000 mL IV    Emergency Department Course:    1119 Nursing notes and vitals reviewed.    1133 I performed an exam of the patient as documented above.     1144 IV was inserted and blood was drawn for laboratory testing, results above.     1308 The patient states she is feeling about the same. I performed the bedside ultrasound and obtained a wound culture. I demarcated the area of induration.     1320 I spoke with Nilda Hill PA-C, for  of the Hospitalist service regarding patient's presentation, findings, and plan of care.      1353 The patient provided a urine sample here in the emergency department. This was sent for laboratory testing, findings above.     1424 I personally discussed the laboratory and imaging results with the patient and answered all related questions prior to admission.    Impression & Plan      Medical Decision Making:  Daxa is a 33 year old otherwise healthy woman who was seen here 3 days ago for an abscess on her right upper back.  She had I&D which was uncomplicated and she was discharged on Bactrim.  However, since discharge she states \"it has gotten worse\" and she has developed fever up to 102  F.  On exam the area has only minimal erythema with large area of tender induration.  The incision site is still open and I did obtain a culture from this.  Bedside ultrasound was performed which reveals no focal fluid collections and there is no indication for repeat incision and drainage.  There is no crepitus to suggest a " necrotizing infection.  Given persistent fever and pain, blood cultures were obtained.  There is no other obvious source for infection including no cough and no evidence of UTI on urinalysis.  She has no leukocytosis and normal lactate though CRP is elevated at 83.  Procalcitonin is pending for further characterization of patient's illness though I feel at this point she will require IV antibiotics as she has failed outpatient treatment with Bactrim with persistent fever and pain.  I will give her an empiric dose of vancomycin for treatment.  I have also given her IV fluids as she is tachycardic and she was given Tylenol and Toradol for pain.  I updated the patient on plan for admission for IV antibiotics and to ensure she is improving appropriately while awaiting cultures.  She verbalized understanding and is amenable to plan.  I discussed patient's case with Nilda Hill hospitalist PA, who accepts admission and has no further orders.    Diagnosis:    ICD-10-CM    1. Failure of outpatient treatment Z78.9    2. Cellulitis of back L03.312      Disposition:   The patient is admitted into the care of Nilda Hill PA-C.    Scribe Disclosure:  MITZI, Nirmala Khalil, am serving as a scribe at 11:13 AM on 7/28/2019 to document services personally performed by Shanel Mccullough MD based on my observations and the provider's statements to me.        [facility] EMERGENCY DEPARTMENT       Shanel Mccullough MD  07/28/19 [id number]

## 2019-07-29 ENCOUNTER — ANESTHESIA EVENT (OUTPATIENT)
Dept: SURGERY | Facility: CLINIC | Age: 34
DRG: 572 | End: 2019-07-29
Payer: COMMERCIAL

## 2019-07-29 ENCOUNTER — ANESTHESIA (OUTPATIENT)
Dept: SURGERY | Facility: CLINIC | Age: 34
DRG: 572 | End: 2019-07-29
Payer: COMMERCIAL

## 2019-07-29 ENCOUNTER — APPOINTMENT (OUTPATIENT)
Dept: SURGERY | Facility: PHYSICIAN GROUP | Age: 34
End: 2019-07-29
Payer: COMMERCIAL

## 2019-07-29 PROBLEM — L02.419 SHOULDER ABSCESS: Status: ACTIVE | Noted: 2019-07-29

## 2019-07-29 LAB
GRAM STN SPEC: ABNORMAL
GRAM STN SPEC: ABNORMAL
INTERPRETATION ECG - MUSE: NORMAL
SPECIMEN SOURCE: ABNORMAL

## 2019-07-29 PROCEDURE — 87076 CULTURE ANAEROBE IDENT EACH: CPT | Performed by: SURGERY

## 2019-07-29 PROCEDURE — 96376 TX/PRO/DX INJ SAME DRUG ADON: CPT

## 2019-07-29 PROCEDURE — 37000008 ZZH ANESTHESIA TECHNICAL FEE, 1ST 30 MIN: Performed by: SURGERY

## 2019-07-29 PROCEDURE — 96361 HYDRATE IV INFUSION ADD-ON: CPT

## 2019-07-29 PROCEDURE — 99225 ZZC SUBSEQUENT OBSERVATION CARE,LEVEL II: CPT | Performed by: PHYSICIAN ASSISTANT

## 2019-07-29 PROCEDURE — 37000009 ZZH ANESTHESIA TECHNICAL FEE, EACH ADDTL 15 MIN: Performed by: SURGERY

## 2019-07-29 PROCEDURE — G0378 HOSPITAL OBSERVATION PER HR: HCPCS

## 2019-07-29 PROCEDURE — 25000128 H RX IP 250 OP 636: Performed by: SURGERY

## 2019-07-29 PROCEDURE — 87205 SMEAR GRAM STAIN: CPT | Performed by: SURGERY

## 2019-07-29 PROCEDURE — 88305 TISSUE EXAM BY PATHOLOGIST: CPT | Performed by: SURGERY

## 2019-07-29 PROCEDURE — 0JBD0ZZ EXCISION OF RIGHT UPPER ARM SUBCUTANEOUS TISSUE AND FASCIA, OPEN APPROACH: ICD-10-PCS | Performed by: SURGERY

## 2019-07-29 PROCEDURE — 25000128 H RX IP 250 OP 636: Performed by: NURSE ANESTHETIST, CERTIFIED REGISTERED

## 2019-07-29 PROCEDURE — 25000128 H RX IP 250 OP 636: Performed by: PHYSICIAN ASSISTANT

## 2019-07-29 PROCEDURE — 25000125 ZZHC RX 250: Performed by: SURGERY

## 2019-07-29 PROCEDURE — 25000125 ZZHC RX 250: Performed by: PHYSICIAN ASSISTANT

## 2019-07-29 PROCEDURE — 99252 IP/OBS CONSLTJ NEW/EST SF 35: CPT | Mod: 25 | Performed by: SURGERY

## 2019-07-29 PROCEDURE — 36000050 ZZH SURGERY LEVEL 2 1ST 30 MIN: Performed by: SURGERY

## 2019-07-29 PROCEDURE — 25000125 ZZHC RX 250: Performed by: NURSE ANESTHETIST, CERTIFIED REGISTERED

## 2019-07-29 PROCEDURE — 40000306 ZZH STATISTIC PRE PROC ASSESS II: Performed by: SURGERY

## 2019-07-29 PROCEDURE — 36000052 ZZH SURGERY LEVEL 2 EA 15 ADDTL MIN: Performed by: SURGERY

## 2019-07-29 PROCEDURE — 25800030 ZZH RX IP 258 OP 636: Performed by: ANESTHESIOLOGY

## 2019-07-29 PROCEDURE — 71000012 ZZH RECOVERY PHASE 1 LEVEL 1 FIRST HR: Performed by: SURGERY

## 2019-07-29 PROCEDURE — 87077 CULTURE AEROBIC IDENTIFY: CPT | Performed by: SURGERY

## 2019-07-29 PROCEDURE — 10061 I&D ABSCESS COMP/MULTIPLE: CPT | Performed by: SURGERY

## 2019-07-29 PROCEDURE — 87075 CULTR BACTERIA EXCEPT BLOOD: CPT | Performed by: SURGERY

## 2019-07-29 PROCEDURE — 25000132 ZZH RX MED GY IP 250 OP 250 PS 637: Performed by: SURGERY

## 2019-07-29 PROCEDURE — 87186 SC STD MICRODIL/AGAR DIL: CPT | Performed by: SURGERY

## 2019-07-29 PROCEDURE — 88305 TISSUE EXAM BY PATHOLOGIST: CPT | Mod: 26 | Performed by: SURGERY

## 2019-07-29 PROCEDURE — 27210794 ZZH OR GENERAL SUPPLY STERILE: Performed by: SURGERY

## 2019-07-29 PROCEDURE — 87070 CULTURE OTHR SPECIMN AEROBIC: CPT | Performed by: SURGERY

## 2019-07-29 PROCEDURE — 25000132 ZZH RX MED GY IP 250 OP 250 PS 637: Performed by: PHYSICIAN ASSISTANT

## 2019-07-29 RX ORDER — SODIUM CHLORIDE, SODIUM LACTATE, POTASSIUM CHLORIDE, CALCIUM CHLORIDE 600; 310; 30; 20 MG/100ML; MG/100ML; MG/100ML; MG/100ML
INJECTION, SOLUTION INTRAVENOUS CONTINUOUS
Status: DISCONTINUED | OUTPATIENT
Start: 2019-07-29 | End: 2019-07-30 | Stop reason: HOSPADM

## 2019-07-29 RX ORDER — ONDANSETRON 2 MG/ML
4 INJECTION INTRAMUSCULAR; INTRAVENOUS EVERY 6 HOURS PRN
Status: DISCONTINUED | OUTPATIENT
Start: 2019-07-29 | End: 2019-07-30 | Stop reason: HOSPADM

## 2019-07-29 RX ORDER — SODIUM CHLORIDE, SODIUM LACTATE, POTASSIUM CHLORIDE, CALCIUM CHLORIDE 600; 310; 30; 20 MG/100ML; MG/100ML; MG/100ML; MG/100ML
INJECTION, SOLUTION INTRAVENOUS CONTINUOUS
Status: DISCONTINUED | OUTPATIENT
Start: 2019-07-29 | End: 2019-07-29 | Stop reason: HOSPADM

## 2019-07-29 RX ORDER — MAGNESIUM HYDROXIDE 1200 MG/15ML
LIQUID ORAL PRN
Status: DISCONTINUED | OUTPATIENT
Start: 2019-07-29 | End: 2019-07-29 | Stop reason: HOSPADM

## 2019-07-29 RX ORDER — DEXAMETHASONE SODIUM PHOSPHATE 4 MG/ML
INJECTION, SOLUTION INTRA-ARTICULAR; INTRALESIONAL; INTRAMUSCULAR; INTRAVENOUS; SOFT TISSUE PRN
Status: DISCONTINUED | OUTPATIENT
Start: 2019-07-29 | End: 2019-07-29

## 2019-07-29 RX ORDER — NEOSTIGMINE METHYLSULFATE 1 MG/ML
VIAL (ML) INJECTION PRN
Status: DISCONTINUED | OUTPATIENT
Start: 2019-07-29 | End: 2019-07-29

## 2019-07-29 RX ORDER — KETAMINE HYDROCHLORIDE 10 MG/ML
INJECTION INTRAMUSCULAR; INTRAVENOUS PRN
Status: DISCONTINUED | OUTPATIENT
Start: 2019-07-29 | End: 2019-07-29

## 2019-07-29 RX ORDER — PROPOFOL 10 MG/ML
INJECTION, EMULSION INTRAVENOUS PRN
Status: DISCONTINUED | OUTPATIENT
Start: 2019-07-29 | End: 2019-07-29

## 2019-07-29 RX ORDER — HYDRALAZINE HYDROCHLORIDE 20 MG/ML
2.5-5 INJECTION INTRAMUSCULAR; INTRAVENOUS EVERY 10 MIN PRN
Status: DISCONTINUED | OUTPATIENT
Start: 2019-07-29 | End: 2019-07-29

## 2019-07-29 RX ORDER — HYDROMORPHONE HYDROCHLORIDE 1 MG/ML
0.2 INJECTION, SOLUTION INTRAMUSCULAR; INTRAVENOUS; SUBCUTANEOUS
Status: DISCONTINUED | OUTPATIENT
Start: 2019-07-29 | End: 2019-07-30 | Stop reason: HOSPADM

## 2019-07-29 RX ORDER — LIDOCAINE 40 MG/G
CREAM TOPICAL
Status: DISCONTINUED | OUTPATIENT
Start: 2019-07-29 | End: 2019-07-29 | Stop reason: HOSPADM

## 2019-07-29 RX ORDER — LIDOCAINE HYDROCHLORIDE 10 MG/ML
INJECTION, SOLUTION INFILTRATION; PERINEURAL PRN
Status: DISCONTINUED | OUTPATIENT
Start: 2019-07-29 | End: 2019-07-29

## 2019-07-29 RX ORDER — ONDANSETRON 4 MG/1
4 TABLET, ORALLY DISINTEGRATING ORAL EVERY 30 MIN PRN
Status: DISCONTINUED | OUTPATIENT
Start: 2019-07-29 | End: 2019-07-29

## 2019-07-29 RX ORDER — FENTANYL CITRATE 50 UG/ML
25-50 INJECTION, SOLUTION INTRAMUSCULAR; INTRAVENOUS
Status: DISCONTINUED | OUTPATIENT
Start: 2019-07-29 | End: 2019-07-29

## 2019-07-29 RX ORDER — ONDANSETRON 2 MG/ML
4 INJECTION INTRAMUSCULAR; INTRAVENOUS EVERY 30 MIN PRN
Status: DISCONTINUED | OUTPATIENT
Start: 2019-07-29 | End: 2019-07-29

## 2019-07-29 RX ORDER — HYDROMORPHONE HYDROCHLORIDE 1 MG/ML
.3-.5 INJECTION, SOLUTION INTRAMUSCULAR; INTRAVENOUS; SUBCUTANEOUS EVERY 5 MIN PRN
Status: DISCONTINUED | OUTPATIENT
Start: 2019-07-29 | End: 2019-07-29

## 2019-07-29 RX ORDER — FENTANYL CITRATE 50 UG/ML
INJECTION, SOLUTION INTRAMUSCULAR; INTRAVENOUS PRN
Status: DISCONTINUED | OUTPATIENT
Start: 2019-07-29 | End: 2019-07-29

## 2019-07-29 RX ORDER — NALOXONE HYDROCHLORIDE 0.4 MG/ML
.1-.4 INJECTION, SOLUTION INTRAMUSCULAR; INTRAVENOUS; SUBCUTANEOUS
Status: DISCONTINUED | OUTPATIENT
Start: 2019-07-29 | End: 2019-07-29

## 2019-07-29 RX ORDER — GLYCOPYRROLATE 0.2 MG/ML
INJECTION, SOLUTION INTRAMUSCULAR; INTRAVENOUS PRN
Status: DISCONTINUED | OUTPATIENT
Start: 2019-07-29 | End: 2019-07-29

## 2019-07-29 RX ORDER — NALOXONE HYDROCHLORIDE 0.4 MG/ML
.1-.4 INJECTION, SOLUTION INTRAMUSCULAR; INTRAVENOUS; SUBCUTANEOUS
Status: DISCONTINUED | OUTPATIENT
Start: 2019-07-29 | End: 2019-07-30 | Stop reason: HOSPADM

## 2019-07-29 RX ORDER — LIDOCAINE 40 MG/G
CREAM TOPICAL
Status: DISCONTINUED | OUTPATIENT
Start: 2019-07-29 | End: 2019-07-30 | Stop reason: HOSPADM

## 2019-07-29 RX ORDER — ONDANSETRON 4 MG/1
4 TABLET, ORALLY DISINTEGRATING ORAL EVERY 6 HOURS PRN
Status: DISCONTINUED | OUTPATIENT
Start: 2019-07-29 | End: 2019-07-30 | Stop reason: HOSPADM

## 2019-07-29 RX ORDER — HYDROCODONE BITARTRATE AND ACETAMINOPHEN 5; 325 MG/1; MG/1
1-2 TABLET ORAL EVERY 4 HOURS PRN
Status: DISCONTINUED | OUTPATIENT
Start: 2019-07-29 | End: 2019-07-30 | Stop reason: HOSPADM

## 2019-07-29 RX ORDER — DIMENHYDRINATE 50 MG/ML
25 INJECTION, SOLUTION INTRAMUSCULAR; INTRAVENOUS
Status: DISCONTINUED | OUTPATIENT
Start: 2019-07-29 | End: 2019-07-29

## 2019-07-29 RX ORDER — LABETALOL 20 MG/4 ML (5 MG/ML) INTRAVENOUS SYRINGE
10
Status: DISCONTINUED | OUTPATIENT
Start: 2019-07-29 | End: 2019-07-29

## 2019-07-29 RX ADMIN — CLINDAMYCIN PHOSPHATE 900 MG: 900 INJECTION, SOLUTION INTRAVENOUS at 21:50

## 2019-07-29 RX ADMIN — CLINDAMYCIN PHOSPHATE 900 MG: 900 INJECTION, SOLUTION INTRAVENOUS at 13:42

## 2019-07-29 RX ADMIN — SODIUM CHLORIDE, POTASSIUM CHLORIDE, SODIUM LACTATE AND CALCIUM CHLORIDE 100 ML/HR: 600; 310; 30; 20 INJECTION, SOLUTION INTRAVENOUS at 18:23

## 2019-07-29 RX ADMIN — SODIUM CHLORIDE, POTASSIUM CHLORIDE, SODIUM LACTATE AND CALCIUM CHLORIDE: 600; 310; 30; 20 INJECTION, SOLUTION INTRAVENOUS at 16:16

## 2019-07-29 RX ADMIN — MIDAZOLAM 2 MG: 1 INJECTION INTRAMUSCULAR; INTRAVENOUS at 16:16

## 2019-07-29 RX ADMIN — SERTRALINE HYDROCHLORIDE 50 MG: 50 TABLET ORAL at 07:29

## 2019-07-29 RX ADMIN — FENTANYL CITRATE 50 MCG: 50 INJECTION, SOLUTION INTRAMUSCULAR; INTRAVENOUS at 16:47

## 2019-07-29 RX ADMIN — GLYCOPYRROLATE 0.4 MG: 0.2 INJECTION, SOLUTION INTRAMUSCULAR; INTRAVENOUS at 16:48

## 2019-07-29 RX ADMIN — CLINDAMYCIN PHOSPHATE 900 MG: 900 INJECTION, SOLUTION INTRAVENOUS at 05:42

## 2019-07-29 RX ADMIN — ONDANSETRON 4 MG: 2 INJECTION INTRAMUSCULAR; INTRAVENOUS at 16:18

## 2019-07-29 RX ADMIN — PROPOFOL 200 MG: 10 INJECTION, EMULSION INTRAVENOUS at 16:18

## 2019-07-29 RX ADMIN — ROCURONIUM BROMIDE 35 MG: 10 INJECTION INTRAVENOUS at 16:18

## 2019-07-29 RX ADMIN — HYDROCODONE BITARTRATE AND ACETAMINOPHEN 1 TABLET: 5; 325 TABLET ORAL at 20:05

## 2019-07-29 RX ADMIN — LIDOCAINE HYDROCHLORIDE 50 MG: 10 INJECTION, SOLUTION INFILTRATION; PERINEURAL at 16:18

## 2019-07-29 RX ADMIN — GLYCOPYRROLATE 0.2 MG: 0.2 INJECTION, SOLUTION INTRAMUSCULAR; INTRAVENOUS at 16:18

## 2019-07-29 RX ADMIN — DEXAMETHASONE SODIUM PHOSPHATE 4 MG: 4 INJECTION, SOLUTION INTRA-ARTICULAR; INTRALESIONAL; INTRAMUSCULAR; INTRAVENOUS; SOFT TISSUE at 16:18

## 2019-07-29 RX ADMIN — FENTANYL CITRATE 100 MCG: 50 INJECTION, SOLUTION INTRAMUSCULAR; INTRAVENOUS at 16:18

## 2019-07-29 RX ADMIN — FENTANYL CITRATE 50 MCG: 50 INJECTION, SOLUTION INTRAMUSCULAR; INTRAVENOUS at 16:39

## 2019-07-29 RX ADMIN — Medication 20 MG: at 16:27

## 2019-07-29 RX ADMIN — Medication 3 MG: at 16:48

## 2019-07-29 RX ADMIN — BUPROPION HYDROCHLORIDE 150 MG: 150 TABLET, FILM COATED, EXTENDED RELEASE ORAL at 07:29

## 2019-07-29 RX ADMIN — LEVOTHYROXINE SODIUM 112 MCG: 112 TABLET ORAL at 07:29

## 2019-07-29 ASSESSMENT — ENCOUNTER SYMPTOMS
SEIZURES: 0
DYSRHYTHMIAS: 0
STRIDOR: 0

## 2019-07-29 ASSESSMENT — LIFESTYLE VARIABLES: TOBACCO_USE: 0

## 2019-07-29 ASSESSMENT — COPD QUESTIONNAIRES: COPD: 0

## 2019-07-29 ASSESSMENT — MIFFLIN-ST. JEOR: SCORE: 1868.89

## 2019-07-29 NOTE — PLAN OF CARE
PRIMARY DIAGNOSIS: R Shoulder Cellulitis  OUTPATIENT/OBSERVATION GOALS TO BE MET BEFORE DISCHARGE:  1. Vitals sign stable or return to baseline: Yes    2. Tolerating oral antibiotics or has home infusion set up if applicable: No    3. Pain status: Improved - controlled with oral pain medications.    4. Return to near baseline physical activity: Yes     VSS.   LS clear, adequate sats on room air.  Patient currently denies discomfort.  Patient does state. however, that there is tenderness to the touch; right posterior shoulder.    Right shoulder area covered with dressing/dry, intact.  IV to saline lock.  To OR/Pre-Op at 15:15 pm for planned I&D.  Plan:  Continue to provide supportive cares upon return to Unit.      Discharge Planner Nurse   Safe discharge environment identified: Yes  Barriers to discharge: Yes, unless medically cleared.         Entered by: Karen M. Lesch 07/29/2019 15:15 PM       Please review provider order for any additional goals.     Nurse to notify provider when observation goals have been met and patient is ready for discharge.

## 2019-07-29 NOTE — ANESTHESIA CARE TRANSFER NOTE
Patient: Daxa Wilkes    Procedure(s):  Irrigation and debriedment right posterior shoulder.    Diagnosis: abscess  Diagnosis Additional Information: No value filed.    Anesthesia Type:   General, ETT     Note:  Airway :Face Mask  Patient transferred to:PACU  Comments: VSS.Handoff Report: Identifed the Patient, Identified the Reponsible Provider, Reviewed the pertinent medical history, Discussed the surgical course, Reviewed Intra-OP anesthesia mangement and issues during anesthesia, Set expectations for post-procedure period and Allowed opportunity for questions and acknowledgement of understanding      Vitals: (Last set prior to Anesthesia Care Transfer)    CRNA VITALS  7/29/2019 1627 - 7/29/2019 1702      7/29/2019             Pulse:  109    SpO2:  100 %    Resp Rate (observed):  24                Electronically Signed By: FELICIA Mane CRNA  July 29, 2019  5:02 PM

## 2019-07-29 NOTE — ANESTHESIA PREPROCEDURE EVALUATION
Anesthesia Pre-Procedure Evaluation    Patient: Daxa Wilkes   MRN: [id number] : 1985          Preoperative Diagnosis: abscess    Procedure(s):  Irrigation and debriedment right posterior shoulder.    Past Medical History:   Diagnosis Date     Depressive disorder      GERD (gastroesophageal reflux disease)      HELLP syndrome, delivered, current hospitalization 2016     PUD (peptic ulcer disease)      Thyroid disease     hypo     Vaginal delivery 2016     History reviewed. No pertinent surgical history.  Anesthesia Evaluation     . Pt has had prior anesthetic. Type: General    No history of anesthetic complications          ROS/MED HX    ENT/Pulmonary:     (+)NALDO risk factors obese, , . .   (-) tobacco use, asthma, COPD and other ENT disease   Neurologic:  - neg neurologic ROS    (-) seizures and CVA   Cardiovascular:  - neg cardiovascular ROS      (-) hypertension, CAD, arrhythmias and valvular problems/murmurs   METS/Exercise Tolerance:     Hematologic: Comments: Lab Test        07/28/19     07/12/16     07/11/16     07/10/16                       1144          0646          0715          2322          WBC          7.0           --          8.8          7.7           HGB          14.5          --          12.2         12.2          MCV          95            --          88           89            PLT          169          125*         134*         151            Lab Test        19                       1144          NA           136           POTASSIUM    3.7           CHLORIDE     107           CO2          24            BUN          9             CR           0.64          ANIONGAP     5             SAMIA          8.8           GLC          134*         - neg hematologic  ROS       Musculoskeletal: Comment: Shoulder abscess        GI/Hepatic:     (+) GERD Asymptomatic on medication,       Renal/Genitourinary:  - ROS Renal section negative       Endo:     (+) thyroid problem  "hypothyroidism, Obesity, .   (-) Type I DM, Type II DM and chronic steroid usage   Psychiatric:     (+) psychiatric history depression      Infectious Disease:  - neg infectious disease ROS       Malignancy:         Other:    - neg other ROS                      Physical Exam  Normal systems: cardiovascular, pulmonary and dental    Airway   Mallampati: III  TM distance: >3 FB  Neck ROM: full    Dental     Cardiovascular   Rhythm and rate: regular and normal  (-) no friction rub, no systolic click and no murmur    Pulmonary    breath sounds clear to auscultation(-) no rhonchi, no decreased breath sounds, no wheezes, no rales and no stridor            Lab Results   Component Value Date    WBC 7.0 07/28/2019    HGB 14.5 07/28/2019    HCT 43.9 07/28/2019     07/28/2019    CRP 83.0 (H) 07/28/2019     07/28/2019    POTASSIUM 3.7 07/28/2019    CHLORIDE 107 07/28/2019    CO2 24 07/28/2019    BUN 9 07/28/2019    CR 0.64 07/28/2019     (H) 07/28/2019    SAMIA 8.8 07/28/2019    ALBUMIN 4.7 02/22/2010    PROTTOTAL 8.2 02/22/2010     (H) 07/12/2016    AST 75 (H) 07/12/2016    ALKPHOS 64 02/22/2010    BILITOTAL 0.5 02/22/2010    TSH 2.56 02/22/2010    HCG Negative 04/29/2011       Preop Vitals  BP Readings from Last 3 Encounters:   07/29/19 116/66   07/25/19 130/75   01/03/18 124/64    Pulse Readings from Last 3 Encounters:   07/28/19 97   07/25/19 129   01/03/18 95      Resp Readings from Last 3 Encounters:   07/29/19 16   07/25/19 18   01/03/18 16    SpO2 Readings from Last 3 Encounters:   07/29/19 96%   07/25/19 96%   01/03/18 97%      Temp Readings from Last 1 Encounters:   07/29/19 97.4  F (36.3  C) (Oral)    Ht Readings from Last 1 Encounters:   07/28/19 1.676 m (5' 6\")      Wt Readings from Last 1 Encounters:   07/28/19 115.3 kg (254 lb 1.6 oz)    Estimated body mass index is 41.01 kg/m  as calculated from the following:    Height as of this encounter: 1.676 m (5' 6\").    Weight as of this " encounter: 115.3 kg (254 lb 1.6 oz).       Anesthesia Plan      History & Physical Review  History and physical reviewed and following examination; no interval change.    ASA Status:  2 .    NPO Status:  > 8 hours    Plan for General and ETT with Intravenous induction. Maintenance will be Balanced.    PONV prophylaxis:  Ondansetron (or other 5HT-3) and Dexamethasone or Solumedrol       Postoperative Care  Postoperative pain management:  IV analgesics.      Consents  Anesthetic plan, risks, benefits and alternatives discussed with:  Patient or representative, Patient and Spouse..                 Vin Estevez MD                    .

## 2019-07-29 NOTE — PLAN OF CARE
PRIMARY DIAGNOSIS: SOFT TISSUE INFECTIONS  OUTPATIENT/OBSERVATION GOALS TO BE MET BEFORE DISCHARGE:  1. Vitals sign stable or return to baseline: Yes    2. Tolerating oral antibiotics or has home infusion set up if applicable: N/A, still requires IV abx     3. Pain status: Improved-controlled with oral pain medications.    4. Return to near baseline physical activity: Yes    Discharge Planner Nurse   Safe discharge environment identified: Yes  Barriers to discharge: Yes, still requires IV abx       Entered by: Shonna Raymond 07/29/2019 5:57 AM      VSS, afebrile overnight. Pain 8/10 to R shoulder, tramadol x1 given, ice pack applied. On IV Cleocin. Site to shoulder covered with mepliex, scant amount of drainage noted. Area warm and reddened. Independent. Will continue to monitor.      Please review provider order for any additional goals.     Nurse to notify provider when observation goals have been met and patient is ready for discharge.

## 2019-07-29 NOTE — CONSULTS
Consult Date:  07/29/2019      REQUESTING PHYSICIAN:  Nilda Hill PA-C.       HISTORY OF PRESENT ILLNESS:  Asked to see Daxa Wilkes, a pleasant 33-year-old female with a nonresponding right shoulder cellulitis.  This patient had painful swelling in the posterior right shoulder.  She thinks the original inciting event was a bug bite.  She was seen in the ER on Thursday (5 days ago).  This was I and D'd in the ER and she was sent home on Bactrim.  She completed her course of Bactrim and then subsequently developed progressive pain and swelling at the site and was seen in the ER and readmitted yesterday.  There were initially no cultures done on Thursday, but yesterday she had a needle aspiration for cultures apparently done.        PAST MEDICAL HISTORY:  GERD, HELLP syndrome, peptic ulcer disease, thyroid disease and depression.      PAST SURGICAL HISTORY:  None.      SOCIAL HISTORY:  Nonsmoker.      ALLERGIES:   PENICILLIN G.        FAMILY HISTORY:  Limited as her mother is adopted, but the family history that is available shows extensive thyroid disease.      MEDICATIONS:  Albuterol, citalopram, iron, levothyroxine, and Bactrim.      PHYSICAL EXAMINATION:   GENERAL:  Pleasant, well-developed, well-nourished female lying quietly in bed, not ill-appearing.   VITAL SIGNS:  T-max is 100, pulse runs up to 107, blood pressure 123/61, respirations 16.   HEENT:  Normocephalic, atraumatic.   NECK:  Supple.   LUNGS:  Respirations are unlabored.   EXTREMITIES:  Her right posterior shoulder shows an I and D site less than 1 cm which is clean.  There is an area of surrounding induration of approximately 6-8 cm.  It is tender.  Palpation of this induration does not result in any purulent drainage, but the degree of tenderness is unusual this far into her treatment course.   NEUROLOGIC:  She is alert and oriented, moves all extremities with good strength.  Speech clear.      LABORATORY DATA:  Include gram positives growing  in yesterday's culture, heavy growth of Staph aureus.      ASSESSMENT:  A 33-year-old female with right shoulder soft tissue infection which is not significantly improved despite a prior I and D.  I suspect there may be additional pocketing within the subcutaneous tissue.        PLAN:   She has eaten this morning at 8 a.m., but if she remains n.p.o., we can return to the OR later today after 8 hours and do a more formal debridement of any necrotic tissue and a wider opening of the site.  Planned OR later today.         ANTHONY DRIVER MD             D: 2019   T: 2019   MT:       Name:     COOKIE CHANCE   MRN:      [id number]        Account:       [id number]   :      1985           Consult Date:  2019      Document: [id number]       cc: THANH GRADY PA-C

## 2019-07-29 NOTE — PLAN OF CARE
PRIMARY DIAGNOSIS: R Shoulder Cellulitis  OUTPATIENT/OBSERVATION GOALS TO BE MET BEFORE DISCHARGE:  Vitals sign stable or return to baseline: Yes    Tolerating oral antibiotics or has home infusion set up if applicable: No    Pain status: Improved - controlled with oral pain medications.    Return to near baseline physical activity: Yes    Discharge Planner Nurse   Safe discharge environment identified: Yes  Barriers to discharge: Yes - IV abx, possible I&D this afternoon       Entered by: Aydee Ybarra 07/29/2019        VSS. Pain controlled with ice pack & repositioning. Wound on R shoulder open with some slough, clear drainage. Mepilex in place. No redness noted around wound, tender all around wound and on shoulder. Up ind; steady gait observed. Made NPO after breakfast for general surgery consult.     Please review provider order for any additional goals.     Nurse to notify provider when observation goals have been met and patient is ready for discharge.

## 2019-07-29 NOTE — PLAN OF CARE
PRIMARY DIAGNOSIS: SOFT TISSUE INFECTIONS  OUTPATIENT/OBSERVATION GOALS TO BE MET BEFORE DISCHARGE:  Vitals sign stable or return to baseline: Yes    Tolerating oral antibiotics or has home infusion set up if applicable: N/A, still requires IV abx     Pain status: Improved-controlled with oral pain medications.    Return to near baseline physical activity: Yes    Discharge Planner Nurse   Safe discharge environment identified: Yes  Barriers to discharge: Yes, still requires IV abx       Entered by: Kellen Aguilar 07/28/2019 9:48 PM     Please review provider order for any additional goals.     Nurse to notify provider when observation goals have been met and patient is ready for discharge.    VSS on RA, afebrile at this time; pt reports feeling intermittent chills. Pain controlled with ice pack application and previous tramadol, no requests for PRN intervention at this time. PIV SL with IV abx as ordered. Site warm, erythematous, scant amount of serosanguinous drainage. Mepilex applied. Alert and oriented, able to make needs known. Continue to monitor.

## 2019-07-29 NOTE — PLAN OF CARE
PRIMARY DIAGNOSIS: SOFT TISSUE INFECTIONS  OUTPATIENT/OBSERVATION GOALS TO BE MET BEFORE DISCHARGE:  1. Vitals sign stable or return to baseline: Yes    2. Tolerating oral antibiotics or has home infusion set up if applicable: N/A, still requires IV abx     3. Pain status: Improved-controlled with oral pain medications.    4. Return to near baseline physical activity: Yes    Discharge Planner Nurse   Safe discharge environment identified: Yes  Barriers to discharge: Yes, still requires IV abx       Entered by: Shonna Raymond 07/29/2019 1:42 AM      A&Ox4, VSS, afebrile. Pain 8/10 to R shoulder, PRN tramadol x1 given, ice pack applied. CMS intact. On IV Cleocin. Site to shoulder covered with mepliex, no drainage noted. Area warm and reddened. Independent. Will continue to monitor.      Please review provider order for any additional goals.     Nurse to notify provider when observation goals have been met and patient is ready for discharge.

## 2019-07-29 NOTE — ANESTHESIA POSTPROCEDURE EVALUATION
Patient: Daxa Wilkes    Procedure(s):  Irrigation and debriedment right posterior shoulder.    Diagnosis:abscess  Diagnosis Additional Information: No value filed.    Anesthesia Type:  General, ETT    Note:  Anesthesia Post Evaluation    Patient location during evaluation: PACU  Patient participation: Able to fully participate in evaluation  Level of consciousness: awake  Pain management: adequate  Airway patency: patent  Cardiovascular status: acceptable  Respiratory status: acceptable  Hydration status: acceptable  PONV: controlled     Anesthetic complications: None          Last vitals:  Vitals:    07/29/19 1705 07/29/19 1710 07/29/19 1715   BP: 125/71 (!) 139/90 (!) 146/83   Pulse: 114 112 114   Resp: 16 14 21   Temp:      SpO2: 96% 95% 93%         Electronically Signed By: Vin Estevez MD  July 29, 2019  5:25 PM

## 2019-07-29 NOTE — PROGRESS NOTES
"[facility]  Hospitalist Progress Note  Nilda Hill PA-C 07/29/2019    Reason for Stay (Diagnosis): R post shoulder wound and cellulitis         Assessment and Plan:      Summary of Stay: Daxa Wilkes is a 33 year old female admitted on 7/28/2019 with cellulitis surrounding a wound on her R posterior shoulder. Pt had an abscess in this location drained in the ED on 7/25 and was placed on PO Bactrim.      Problem List:   1. Acute R shoulder wound and cellulitis - developed cyst on posterior R shoulder on 7/24, presented to ED on 7/25, abscess was noted and drained in ED. She was started on Bactrim. Since that time, increasing pain and swelling/induration with fevers at home. CRP significantly elevated at 83. No recurrent abscess collection per POC US in ED. Given IV Vanco in ED. Started on IV Clindamycin here. No significant change in appearance, has remained afebrile overnight. Will consult surgery to consider possible I&D  2. Hypothyroidism - continue home meds  3. Depression - continue home meds    DVT Prophylaxis: Low Risk/Ambulatory with no VTE prophylaxis indicated  Code Status: Full Code  Discharge Dispo: home  Estimated Disch Date / # of Days until Disch: unclear, pending surgery input        Interval History (Subjective):      Pt notes maybe mild improvement in pain. Denies fever or chills or nausea. Tolerating PO intake.                   Physical Exam:      Last Vital Signs:  /75 (BP Location: Right arm)   Pulse 97   Temp 96  F (35.6  C) (Oral)   Resp 16   Ht 1.676 m (5' 6\")   Wt 115.3 kg (254 lb 1.6 oz)   SpO2 96%   Breastfeeding? No   BMI 41.01 kg/m      No intake or output data in the 24 hours ending 07/29/19 0956    Constitutional: Awake, alert, cooperative, no apparent distress   Respiratory: Normal respiratory effort   Cardiovascular:    Abdomen:    Skin: No other rashes, no cyanosis, dry to touch   Neuro: Alert and oriented x3, no weakness, numbness, memory loss "   Extremities: R shoulder - small area of erythema surrounding the wound on posterior R shoulder, otherwise no significant erythema over the area of induration that extends from the wound but is tender to palpate, borders marked - No change   Other(s):        All other systems: Negative          Medications:      All current medications were reviewed with changes reflected in problem list.         Data:      All new lab and imaging data was reviewed.   Labs:  Recent Labs   Lab 07/28/19  1312 07/28/19  1200 07/28/19  1144   CULT PENDING No growth after 19 hours No growth after 19 hours     Recent Labs   Lab 07/28/19  1144   WBC 7.0   HGB 14.5   HCT 43.9   MCV 95        Recent Labs   Lab 07/28/19  1144      POTASSIUM 3.7   CHLORIDE 107   CO2 24   ANIONGAP 5   *   BUN 9   CR 0.64   GFRESTIMATED >90   GFRESTBLACK >90   SAMIA 8.8     Lactic acid - 0.6  Procalcitonin - <0.05    Recent Labs   Lab 07/28/19  1144   CRP 83.0*     Recent Labs   Lab 07/28/19  1353   COLOR Yellow   APPEARANCE Slightly Cloudy   URINEGLC Negative   URINEBILI Negative   URINEKETONE Negative   SG 1.021   UBLD Negative   URINEPH 6.5   PROTEIN 10*   NITRITE Negative   LEUKEST Large*   RBCU 3*   WBCU 5      Imaging:   Recent Results (from the past 48 hour(s))   POC US SOFT TISSUE    Impression    Limited Soft Tissue Ultrasound, performed and interpreted by me.    Indication:  Skin redness warmth pain. Evaluate for cellulitis vs abscess.     Body location: back    Findings:  There is cobblestoning suggestive of cellulitis in the evaluated area. There is no fluid collection to suggest abscess. No foreign body identified    IMPRESSION: Cellulitis.               Nilda Hill PA-C

## 2019-07-29 NOTE — PROGRESS NOTES
XCOVER.  Notified episode of left fingers tingling and numbness while on IV Clindamycin, no sign of inflammation or IV infiltration. Suspect the hand position might have caused it when IV was infusing, doubt reaction or allergy.  We will watch if the symptom occurs again during next dose the antibiotics..

## 2019-07-29 NOTE — PROVIDER NOTIFICATION
"Verbal update to Nirmala Polanco PA-C: Gram stain from shoulder abscess resulted with moderate gram+ cocci in clusters, moderate white blood cells. No new orders at this time. Continue to monitor.    10:22 PM  Text page sent to Admitting Hospitalist:    \"Pt receiving IV cleocin via L PIV, started c/o L hand numbness then tingling. Please advise. Thank you.\"    10:37 PM  Spoke with Dr. Lal via telephone; no concerns by MD regarding complaint. OK to proceed with additional infusions of ordered cleocin. Continue to monitor.  "

## 2019-07-29 NOTE — PLAN OF CARE
PRIMARY DIAGNOSIS: R Shoulder Cellulitis  OUTPATIENT/OBSERVATION GOALS TO BE MET BEFORE DISCHARGE:  1. Vitals sign stable or return to baseline: Yes    2. Tolerating oral antibiotics or has home infusion set up if applicable: No    3. Pain status: Improved - controlled with oral pain medications.    4. Return to near baseline physical activity: Yes     Discharge Planner Nurse   Safe discharge environment identified: Yes  Barriers to discharge: Yes - IV abx, OR @ 1600 for I&D       Entered by: Aydee Ybarra 07/29/2019        VSS. Pain controlled with ice pack & repositioning. Wound on R shoulder open with some slough, clear drainage. Mepilex in place. No redness noted around wound,  But tender all around wound. Receiving cleocin q 8 hours. Up ind; steady gait observed. NPO for I&D at 1600.     Please review provider order for any additional goals.     Nurse to notify provider when observation goals have been met and patient is ready for discharge.

## 2019-07-30 VITALS
RESPIRATION RATE: 16 BRPM | TEMPERATURE: 96 F | WEIGHT: 254 LBS | DIASTOLIC BLOOD PRESSURE: 66 MMHG | OXYGEN SATURATION: 97 % | BODY MASS INDEX: 40.82 KG/M2 | SYSTOLIC BLOOD PRESSURE: 113 MMHG | HEIGHT: 66 IN | HEART RATE: 97 BPM

## 2019-07-30 LAB
BACTERIA SPEC CULT: ABNORMAL
BACTERIA SPEC CULT: ABNORMAL
Lab: ABNORMAL
SPECIMEN SOURCE: ABNORMAL

## 2019-07-30 PROCEDURE — 12000011 ZZH R&B MS OVERFLOW

## 2019-07-30 PROCEDURE — 96376 TX/PRO/DX INJ SAME DRUG ADON: CPT

## 2019-07-30 PROCEDURE — 25000132 ZZH RX MED GY IP 250 OP 250 PS 637: Performed by: SURGERY

## 2019-07-30 PROCEDURE — 99239 HOSP IP/OBS DSCHRG MGMT >30: CPT | Performed by: PHYSICIAN ASSISTANT

## 2019-07-30 PROCEDURE — G0378 HOSPITAL OBSERVATION PER HR: HCPCS

## 2019-07-30 PROCEDURE — 25000128 H RX IP 250 OP 636: Performed by: HOSPITALIST

## 2019-07-30 PROCEDURE — 25000125 ZZHC RX 250: Performed by: SURGERY

## 2019-07-30 PROCEDURE — 25800030 ZZH RX IP 258 OP 636: Performed by: HOSPITALIST

## 2019-07-30 RX ORDER — HYDROCODONE BITARTRATE AND ACETAMINOPHEN 5; 325 MG/1; MG/1
1-2 TABLET ORAL EVERY 4 HOURS PRN
Qty: 15 TABLET | Refills: 0 | Status: SHIPPED | OUTPATIENT
Start: 2019-07-30 | End: 2019-08-02

## 2019-07-30 RX ORDER — CEFAZOLIN SODIUM 1 G/50ML
1750 SOLUTION INTRAVENOUS EVERY 12 HOURS
Status: DISCONTINUED | OUTPATIENT
Start: 2019-07-30 | End: 2019-07-30 | Stop reason: HOSPADM

## 2019-07-30 RX ORDER — SULFAMETHOXAZOLE/TRIMETHOPRIM 800-160 MG
1 TABLET ORAL 2 TIMES DAILY
Qty: 28 TABLET | Refills: 0 | Status: SHIPPED | OUTPATIENT
Start: 2019-07-30 | End: 2019-08-27

## 2019-07-30 RX ADMIN — SERTRALINE HYDROCHLORIDE 50 MG: 50 TABLET ORAL at 08:20

## 2019-07-30 RX ADMIN — VANCOMYCIN HYDROCHLORIDE 1750 MG: 10 INJECTION, POWDER, LYOPHILIZED, FOR SOLUTION INTRAVENOUS at 10:17

## 2019-07-30 RX ADMIN — LEVOTHYROXINE SODIUM 112 MCG: 112 TABLET ORAL at 08:20

## 2019-07-30 RX ADMIN — HYDROCODONE BITARTRATE AND ACETAMINOPHEN 2 TABLET: 5; 325 TABLET ORAL at 08:20

## 2019-07-30 RX ADMIN — BUPROPION HYDROCHLORIDE 150 MG: 150 TABLET, FILM COATED, EXTENDED RELEASE ORAL at 08:20

## 2019-07-30 RX ADMIN — CLINDAMYCIN PHOSPHATE 900 MG: 900 INJECTION, SOLUTION INTRAVENOUS at 06:14

## 2019-07-30 NOTE — PLAN OF CARE
PRIMARY DIAGNOSIS: I&D OF RIGHT SHOULDER WOUND   OUTPATIENT/OBSERVATION GOALS TO BE MET BEFORE DISCHARGE:  1. Stable vital signs Yes  2. Tolerating diet:Yes  3. Pain controlled with oral pain medications:  Yes  4. Positive bowel sounds:  Yes  5. Voiding without difficulty:  Yes  6. Able to ambulate:  Yes  7. Provider specific discharge goals met:  Yes     Discharge Planner Nurse   Safe discharge environment identified: Yes  Barriers to discharge: No       Entered by: Kanwal Back 07/30/2019       VSS. Denies nausea, tolerating regular diet. Pain controlled with PO [place] PRN. I&D completed today to R shoulder, dressing WDL. Voiding adequately, independent in room. Patient's neighbor is a nurse and will be doing her daily dressing and packing changes. Will continue to monitor.      Please review provider order for any additional goals.   Nurse to notify provider when observation goals have been met and patient is ready for discharge.

## 2019-07-30 NOTE — PROGRESS NOTES
Infection Prevention:    Patient requires Contact precautions because of MRSA: Back 7/28/19. Please contact Infection Prevention with any questions/concerns at *[phone].    Alida Tierney, ICP

## 2019-07-30 NOTE — PLAN OF CARE
Patient's After Visit Summary was reviewed with patient and/or family.   Patient verbalized understanding of After Visit Summary, recommended follow up and was given an opportunity to ask questions.   Discharge medications sent home with patient/family: Bactrim Prescription sent to preferred pharmacy. Paper Prescription of Norco sent with patient.   Discharged with other: Family            OBSERVATION patient END time: 2:59 PM

## 2019-07-30 NOTE — PROGRESS NOTES
"[facility]  General Surgery Progress Note           Assessment and Plan:   Assessment:   POD #1 excisional debridement right posterior shoulder abscess  Cultures show MRSA  Intraoperative cultures pending  Afebrile      Plan:   -OK to DC later today after friend receives wound packing education, discharge per hospitalist   -DC Rx: Antibiotics per ID, packing supplies, Norco.    -Daily wet to dry packing changes at discharge  -OTC bowel program prn.    -RTC tomorrow for further wound education if needed, or by the end of the week for wound check.  Discharge instructions were reviewed with the patient in detail.  All her questions/concerns were addressed.  She is aware that a printed copy of instructions will be given to her upon discharge.         Interval History:   Comfortable in bed. Reports pain is well controlled with oral pain medication. Tolerating diet. Up walking. Voiding independently. Reports a friend who is an RN will be assisting with wound cares at home and will be here later today to get packing education.        Physical Exam:   Blood pressure 110/69, pulse 97, temperature 96.1  F (35.6  C), temperature source Oral, resp. rate 16, height 1.676 m (5' 6\"), weight 115.2 kg (254 lb), SpO2 96 %, not currently breastfeeding.    I/O last 3 completed shifts:  In: 400 [P.O.:100; I.V.:300]  Out: 20 [Blood:20]    Right posterior shoulder:  Serosanguinous drainage on packing, packing soaked with normal saline and gently removed. Minimal bleeding on inferior skin edge, well controlled. Wound appeared clean with minimal drainage. Wound was irrigated with normal saline and repacking with nuguaz and dressed with 4x4 and medipore tape. Patient tolerated well.           Data:     Recent Labs   Lab 07/28/19  1144   WBC 7.0   HGB 14.5   HCT 43.9   MCV 95        Lanie South PA-C     Seen and agree.  Meeta Hilton MD    "

## 2019-07-30 NOTE — OP NOTE
Procedure Date: 07/29/2019      PREOPERATIVE DIAGNOSIS:  Right posterior shoulder abscess.      POSTOPERATIVE DIAGNOSIS:  Right posterior shoulder abscess.      PROCEDURE:  Excisional debridement, right posterior shoulder abscess.      ANESTHESIA:  General.      PREOPERATIVE MEDICATIONS:  Clindamycin.      SURGEON:  Meeta Hilton MD      ASSISTANT:  Mirta Noonan PA-C.  The physician assistant medically necessary in providing adequate exposure in the operating field, maintaining hemostasis clamping and ligating blood vessels and visualization of the anatomic structures throughout the surgical procedure.      INDICATIONS:  Daxa is a 33-year-old female with a soft tissue infection of her right posterior shoulder since last Thursday (5 days ago).  This has been incised for drainage in the ER on Thursday and despite antibiotics, she has had progression of her infection.  The area of induration beneath the wound is about 6 to 8 cm and no purulence is really draining from the small I and D site.  She was brought to the operating room at this time for formal debridement.      PROCEDURE:  The patient was placed laterally with the right shoulder up.  The area was prepped and draped in the usual sterile fashion.  Initially, we probed the small wound and found a tract that extended cephalad for several centimeters.  Probing it, copious amount of mucoid purulent material drained and was cultured.  I then incised the overlying skin along that tract.  A second tract was then found in the cephalad and lateral direction and this also was quite deep from the skin site and extended for several centimeters and this had to be unroofed by extending the incision in that direction.  In the end,  the incision was somewhat of a stellate-shaped incision site.  The abscess cavity was 2.5 cm deep and down on the muscular layer.  The underlying subcutaneous fatty tissue was necrotic and was sharply debrided with scalpel and curet.  Pus was  sent for tissue cultures and tissue was sent for pathology.  Once we had adequate exposure of the underlying abscess cavity, we inspected for hemostasis, irrigated, and packed over the open with 1-inch Nu Gauze.  Local was instilled for postoperative pain control and the sterile dressings were applied.  The patient transferred to recovery in good condition.      ESTIMATED BLOOD LOSS:  20 mL.      INTRAOPERATIVE FINDINGS:  Deep abscess cavity down on the fascia.  A total abscess size 6 cm in length of 4 cm in width and 2.5 cm in depth.         ANTHONY DRIVER MD             D: 2019   T: 2019   MT: TAM      Name:     COOKIE CHANCE   MRN:      [id number]        Account:        [id number]   :      1985           Procedure Date: 2019      Document: [id number]

## 2019-07-30 NOTE — PHARMACY-VANCOMYCIN DOSING SERVICE
Pharmacy Vancomycin Initial Note  Date of Service 2019  Patient's  1985  33 year old, female    Indication: Skin and Soft Tissue Infection    Current estimated CrCl = Estimated Creatinine Clearance: 159.7 mL/min (based on SCr of 0.64 mg/dL).    Creatinine for last 3 days  2019: 11:44 AM Creatinine 0.64 mg/dL    Recent Vancomycin Level(s) for last 3 days  No results found for requested labs within last 72 hours.      Vancomycin IV Administrations (past 72 hours)                   vancomycin (VANCOCIN) 1,750 mg in sodium chloride 0.9 % 500 mL intermittent infusion (mg) 1,750 mg Given 19 1238                Nephrotoxins and other renal medications (From now, onward)    Start     Dose/Rate Route Frequency Ordered Stop    19 0900  vancomycin (VANCOCIN) 1,750 mg in sodium chloride 0.9 % 500 mL intermittent infusion      1,750 mg  over 2 Hours Intravenous EVERY 12 HOURS 19 0835      19 1510  ibuprofen (ADVIL/MOTRIN) tablet 600 mg      600 mg Oral EVERY 6 HOURS PRN 19 1510            Contrast Orders - past 72 hours (72h ago, onward)    None                Plan:  1.  Start vancomycin  1750 mg IV q12h.   2.  Goal Trough Level: 10-15 mg/L   3.  Pharmacy will check trough levels as appropriate in 1-3 Days.    4. Serum creatinine levels will be ordered a minimum of twice weekly.    5. Donalds method utilized to dose vancomycin therapy: Method 1    George Butcher

## 2019-07-30 NOTE — DISCHARGE INSTRUCTIONS
HOME CARE FOLLOWING DEBRIDEMENT  DEMETRIO Collazo N. Guttormson, D. Maurer, R. O Donnell, J. Shaheen  Special instructions for Daxa Wilkes:  --Can call and make wound education appointment for tomorrow if needed, or by the end of the week.    RETURN APPOINTMENT:  As recommended by the surgeon.   Office Phone:  [phone]       WOUND CARE:    Dressing changes should be done one or two times a day as recommended by your surgeon.    Dressing change of wet-to-dry dressin. Remove outer layer of dressing material  2. Moisten the packing in the wound, either with saline or in the shower (it is ok if soap gets into the packing/wound area). 3. After the packing is adequately moistened, slowly remove the packing material and rinse the wound with saline or allow water from your shower rinse the wound by allowing it to run down into the site (NOT directly hitting the site).  4. Once the wound bed is cleansed, pat the area dry.  5. Repack the wound with saline-moistened gauze or recommended packing material.  Try not to allow the moistened gauze to contact your normal skin outside of the wound.  6. Apply over-lay absorbant dry gauze and tape in place.     If you have any questions or concerns about your wound or wound care, or would like further instruction, please contact your surgeon's office.  If you have a complicated wound and have been instructed by a Specialized Wound Care Nurse during your hospitalization, and they have given you contact information to reach them, you may also contact them.    ACTIVITY:  Minimize lifting and stretching of area of debridement and the closest extremity (arm or leg) until further recommended by your surgeon.  If your surgery site is in the abdomen, restrict from overhead reaching and/or stretching.    DIET:  No restrictions.  Increased fluid intake is recommended. While taking pain medications, increase dietary fiber or add a fiber supplementation like Metamucil or  Citrucel to help prevent constipation - a possible side effect of pain medications.    DISCOMFORT:  Begin taking pain pills before discomfort is severe.  Take the pain medication with some food, when possible, to minimize side effects.  Intermittent use of ice packs may help.  Expect gradual improvement.    RETURN APPOINTMENT:  As recommended by the surgeon.   Office Phone:  [phone]     CONTACT US IF THE FOLLOWING DEVELOPS:   1. A fever that is above 101     2. If there is a large amount of drainage, bleeding, or swelling.   3. Severe pain that is not relieved by your prescription.   4. Drainage that is thick, cloudy, yellow, green or white.   5. Any other questions not answered by  Frequently Asked Questions  sheet.        FREQUENTLY ASKED QUESTIONS:    Q:  How should my incision look?    A:  Normally your incision will appear slightly swollen with light redness directly along the incision itself as it heals.  It may feel like a bump or ridge as the healing/scarring happens, and over time (3-4 months) this bump or ridge feeling should slowly go away.  In general, clear or pink watery drainage can be normal at first as your incision heals, but should decrease over time.    Q:  How do I know if my incision is infected?  A:  Look at your incision for signs of infection, like redness around the incision spreading to surrounding skin, or drainage of cloudy or foul-smelling drainage.  If you feel warm, check your temperature to see if you are running a fever.    **If any of these things occur, please notify the nurse at our office.  We may need you to come into the office for an incision check.      Q:  How do I take care of my incision?  A:  If you have a dressing in place - Starting the day after surgery, replace the dressing 1-2 times a day until there is no further drainage from the incision.  At that time, a dressing is no longer needed.  Try to minimize tape on the skin if irritation is occurring at the tape  sites.  If you have significant irritation from tape on the skin, please call the office to discuss other method of dressing your incision.    Small pieces of tape called  steri-strips  may be present directly overlying your incision; these may be removed 10 days after surgery unless otherwise specified by your surgeon.  If these tapes start to loosen at the ends, you may trim them back until they fall off or are removed.    A:  If you had  Dermabond  tissue glue used as a dressing (this causes your incision to look shiny with a clear covering over it) - This type of dressing wears off with time and does not require more dressings over the top unless it is draining around the glue as it wears off.  Do not apply ointments or lotions over the incisions until the glue has completely worn off.    Q:  There is a piece of tape or a sticky  lead  still on my skin.  Can I remove this?  A:  Sometimes the sticky  leads  used for monitoring during surgery or for evaluation in the emergency department are not all removed while you are in the hospital.  These sometimes have a tab or metal dot on them.  You can easily remove these on your own, like taking off a band-aid.  If there is a gel substance under the  lead , simply wipe/clean it off with a washcloth or paper towel.      Q:  What can I do to minimize constipation (very hard stools, or lack of stools)?  A:  Stay well hydrated.  Increase your dietary fiber intake or take a fiber supplement -with plenty of water.  Walk around frequently.  You may consider an over-the-counter stool-softener.  Your Pharmacist can assist you with choosing one that is stocked at your pharmacy.  Constipation is also one of the most common side effects of pain medication.  If you are using pain medication, be pro-active and try to PREVENT problems with constipation by taking the steps above BEFORE constipation becomes a problem.    Q:  What do I do if I need more pain medications?  A:  Call the  office to receive refills.  Be aware that certain pain meds cannot be called into a pharmacy and actually require a paper prescription.  A change may be made in your pain med as you progress thru your recovery period or if you have side effects to certain meds.    --Pain meds are NOT refilled after 5pm on weekdays, and NOT AT ALL on the weekends, so please look ahead to prevent problems.      Q:  Why am I having a hard time sleeping now that I am at home?  A:  Many medications you receive while you are in the hospital can impact your sleep for a number of days after your surgery/hospitalization.  Decreased level of activity and naps during the day may also make sleeping at night difficult.  Try to minimize day-time naps, and get up frequently during the day to walk around your home during your recovery time.  Sleep aides may be of some help, but are not recommended for long-term use.      Q:  I am having some back discomfort.  What should I do?  A:  This may be related to certain positioning that was required for your surgery, extended periods of time in bed, or other changes in your overall activity level.  You may try ice, heat, acetaminophen, or ibuprofen to treat this temporarily.  Note that many pain medications have acetaminophen in them and would state this on the prescription bottle.  Be sure not to exceed the maximum of 4000mg per day of acetaminophen.     **If the pain you are having does not resolve, is severe, or is a flare of back pain you have had on other occasions prior to surgery, please contact your primary physician for further recommendations or for an appointment to be examined at their office.    Q:  Why am I having headaches?  A:  Headaches can be caused by many things:  caffeine withdrawal, use of pain meds, dehydration, high blood pressure, lack of sleep, over-activity/exhaustion, flare-up of usual migraine headaches.  If you feel this is related to muscle tension (a band-like feeling around  the head, or a pressure at the low-back of the head) you may try ice or heat to this area.  You may need to drink more fluids (try electrolyte drink like Gatorade), rest, or take your usual migraine medications.   **If your headaches do not resolve, worsen, are accompanied by other symptoms, or if your blood pressure is high, please call your primary physician for recommendation and/or examination.    Q:  I am unable to urinate.  What do I do?  A:  A small percentage of people can have difficulty urinating initially after surgery.  This includes being able to urinate only a very small amount at a time and feeling discomfort or pressure in the very low abdomen.  This is called  urinary retention , and is actually an urgent situation.  Proceed to your nearest Emergency department for evaluation (not an Urgent Care Center).  Sometimes the bladder does not work correctly after certain medications you receive during surgery, or related to certain procedures.  You may need to have a catheter placed until your bladder recovers.  When planning to go to an Emergency department, it may help to call the ER to let them know you are coming in for this problem after a surgery.  This may help you get in quicker to be evaluated.  **If you have symptoms of a urinary tract infection, please contact your primary physician for the proper evaluation and treatment.          If you have other questions, please call the office Monday thru Friday between 8am and 5pm to discuss with the nurse or physician assistant.  #[phone]    There is a surgeon ON CALL on weekday evenings and over the weekend in case of urgent need only, and may be contacted at the same number.    If you are having an emergency, call 911 or proceed to your nearest emergency department.

## 2019-07-30 NOTE — PLAN OF CARE
PRIMARY DIAGNOSIS: I&D of R shoulder wound   OUTPATIENT/OBSERVATION GOALS TO BE MET BEFORE DISCHARGE:  1. Stable vital signs Yes  2. Tolerating diet:Yes  3. Pain controlled with oral pain medications:  Yes  4. Positive bowel sounds:  Yes  5. Voiding without difficulty:  Yes  6. Able to ambulate:  Yes  7. Provider specific discharge goals met:  Yes    Pt is A&Ox4. VSS. Temp: 98.2  F (36.8  C) Temp src: Oral BP: 108/65 Pulse: 107 Heart Rate: 110 Resp: 19 SpO2: 94 %  On RA  Pt does report mild pain. Norco 1 tablet given. IVF. SBA for transfers. IV cleocin. Capno in place. Shoulder dressed WDL. Tolerating fulls.     Discharge Planner Nurse   Safe discharge environment identified: Yes  Barriers to discharge: No       Entered by: Nikkie Cortes 07/29/2019 10:01 PM     Please review provider order for any additional goals.   Nurse to notify provider when observation goals have been met and patient is ready for discharge.

## 2019-07-30 NOTE — PLAN OF CARE
PRIMARY DIAGNOSIS: I&D OF RIGHT SHOULDER WOUND   OUTPATIENT/OBSERVATION GOALS TO BE MET BEFORE DISCHARGE:  1. Stable vital signs Yes  2. Tolerating diet:Yes  3. Pain controlled with oral pain medications:  Yes  4. Positive bowel sounds:  Yes  5. Voiding without difficulty:  Yes  6. Able to ambulate:  Yes  7. Provider specific discharge goals met:  Yes     Discharge Planner Nurse   Safe discharge environment identified: Yes  Barriers to discharge: No       Entered by: Kanwal Back 07/30/2019      VSS. Denies nausea, tolerating regular diet. Pain controlled with PO [place] PRN. I&D completed today to R shoulder, dressing WDL. Voiding adequately, independent in room. Patient's neighbor is a nurse, dressing education and packing education was done with patient and neighbor. Sensitivities pending, possible discharge later. Will continue to monitor.      Please review provider order for any additional goals.   Nurse to notify provider when observation goals have been met and patient is ready for discharge.

## 2019-07-30 NOTE — PLAN OF CARE
PRIMARY DIAGNOSIS: I&D of R shoulder wound   OUTPATIENT/OBSERVATION GOALS TO BE MET BEFORE DISCHARGE:  1. Stable vital signs Yes  2. Tolerating diet:Yes  3. Pain controlled with oral pain medications:  Yes  4. Positive bowel sounds:  Yes  5. Voiding without difficulty:  Yes  6. Able to ambulate:  Yes  7. Provider specific discharge goals met:  Yes    Discharge Planner Nurse   Safe discharge environment identified: Yes  Barriers to discharge: No       Entered by: Shonna Raymond 07/30/2019 5:16 AM      VSS. Denies nausea, tolerating regular diet. Pain controlled with PO [place] PRN. I&D completed today to R shoulder, dressing WDL. Voiding adequately, independent in room. Will continue to monitor.      Please review provider order for any additional goals.   Nurse to notify provider when observation goals have been met and patient is ready for discharge.

## 2019-07-30 NOTE — CONSULTS
Consult Date:  07/30/2019      ATTENDING PHYSICIAN:  Hospitalist Service.  Dr. Marium Tavarez.      REASON FOR CONSULTATION:  I was asked by Dr. Tavarez to assess MRSA subcutaneous shoulder abscess.      IMPRESSION:   1.  A 33-year-old female who presented with a subcutaneous right shoulder abscess.   2.  Status post surgical I and D of the abscess 07/29/2019.  Surgical culture is now growing MRSA.   3.  Hypothyroidism.      RECOMMENDATIONS:   1.  Would continue IV vancomycin while the patient is here.   2.  When patient is ready for discharge, I would switch back to oral Bactrim for an additional 2 weeks.  It looks as though the switch to oral antibiotic could be done soon.  I do not think that the previous Bactrim therapy failed, but rather she simply needed wider surgical debridement.      HISTORY OF PRESENT ILLNESS:  This is a 33-year-old female, generally in good health, who 6 days ago after being at her cabin up [place], noted a painful pimple-like area on the upper posterior right shoulder.  This progressed in size and discomfort during the next 24 hours and she was prompted to go to the Emergency Department.  There the area was felt to be an abscess and was lanced in the disease.  There were no cultures obtained.  She was given a course of oral Bactrim, but despite this she had fairly rapid progression of pain, redness and swelling in the area of the posterior right shoulder.  She returned to the Emergency Department 07/28 where she was seen in consultation by Dr. Hilton.  She was taken to the operating room yesterday and found to have more extensive subcutaneous abscess, which was thoroughly debrided.  She was given IV clindamycin and vancomycin.  Maximal temperature reached 102 degrees.  Surgical culture is now growing MRSA.  She has no previous history of recurrent furunculosis.  The posterior shoulder area feels much improved to the patient after surgery.      PAST MEDICAL HISTORY:   1.  Hypothyroidism.   2.   Gastroesophageal reflux disease.   3.  Peptic ulcer disease.   4.  Depression.      SOCIAL HISTORY:   and lives at home.  Works as a nurse.  Tobacco:  None.  Alcohol:  None.      FAMILY HISTORY:  Positive for thyroid disease and breast cancer.      ALLERGIES:  PENICILLIN -- HIVES.      REVIEW OF SYSTEMS:  Negative other than that described above.      PHYSICAL EXAMINATION:   VITAL SIGNS:  Temperature 95.6, blood pressure 100/55, heart rate 81 and regular.   GENERAL:  Well-developed, well-nourished, alert and oriented, does not look acutely ill.   SKIN:  No rashes or nodules.   HEENT:  Eyes:  No subconjunctival hemorrhages or scleral icterus.  Oropharynx without erythema or exudate.   NECK:  Supple, no thyromegaly.   LYMPH:  No cervical or axillary lymphadenopathy.   LUNGS:  Clear to auscultation, no use of accessory muscles of respiration.   COR:  S1, S2 normal.  No S3, S4, or murmur.   ABDOMEN:  Soft, nontender, no mass or hepatosplenomegaly.   EXTREMITIES:  Surgical area of the right posterior shoulder reveals a wick in the surgical site.  There is no obvious surrounding erythema at this point and no swelling.  Minimal tenderness to deep palpation, no fluctuance noted.  No calf tenderness or pedal edema.      For further details of the patient's history, please refer to the chart.  Thank you very much for this consultation.         DUANE RHODES MD             D: 2019   T: 2019   MT: CAMRYN      Name:     COOKIE CHANCE   MRN:      [id number]        Account:       [id number]   :      1985           Consult Date:  2019      Document: [id number]       cc: Meeta Hilton MD

## 2019-07-30 NOTE — PLAN OF CARE
PRIMARY DIAGNOSIS: I&D of R shoulder wound   OUTPATIENT/OBSERVATION GOALS TO BE MET BEFORE DISCHARGE:  1. Stable vital signs Yes  2. Tolerating diet:Yes  3. Pain controlled with oral pain medications:  Yes  4. Positive bowel sounds:  Yes  5. Voiding without difficulty:  Yes  6. Able to ambulate:  Yes  7. Provider specific discharge goals met:  Yes    Discharge Planner Nurse   Safe discharge environment identified: Yes  Barriers to discharge: No       Entered by: Shonna Raymond 07/30/2019 12:00 AM      A&Ox4, VSS on RA. Pt pleasantly declines capnography overnight. Denies nausea, tolerating regular diet. Pain controlled with PO [place] PRN. I&D completed today to R shoulder, dressing WDL. Voiding adequately, independent in room. Needs to ambulate. Will continue to monitor.      Please review provider order for any additional goals.   Nurse to notify provider when observation goals have been met and patient is ready for discharge.

## 2019-07-31 LAB
BACTERIA SPEC CULT: ABNORMAL
COPATH REPORT: NORMAL
SPECIMEN SOURCE: ABNORMAL

## 2019-07-31 NOTE — DISCHARGE SUMMARY
[facility]    Discharge Summary  Hospitalist    Date of Admission:  7/28/2019  Date of Discharge:  7/30/2019  Provider:  Malu Harrington PA-C  Date of Service (when I last saw the patient): 07/30/19    Discharge Diagnoses   Right posterior shoulder abscess and cellulitis   S/p excisional debridement of right posterior shoulder abscess    Other medical issues:  Past Medical History:   Diagnosis Date     Depressive disorder      GERD (gastroesophageal reflux disease)      HELLP syndrome, delivered, current hospitalization 7/13/2016     PUD (peptic ulcer disease)      Thyroid disease     hypo     Vaginal delivery 7/11/2016     History of Present Illness   Daxa Wilkes is an 33 year old female with PMH significant for hypothyroidism, GERD, and depression who presented with worsening pain and induration of the right shoulder at the site of previous abscess drainage on 7/25 and fever.  Please see the admission history and physical for full details.    Hospital Course   Daxa Wilkes was admitted on 7/28/2019.  The following problems were addressed during her hospitalization:    #Right posterior shoulder abscess and cellulitis   #S/p excisional debridement of right posterior shoulder abscess:  developed cyst on posterior R shoulder on 7/24, presented to ED on 7/25, abscess was noted and drained in ED. She was started on Bactrim. Since that time, increasing pain and swelling/induration with fevers at home. CRP significantly elevated at 83. No recurrent abscess collection per POC US in ED. Given IV Vanco in ED. Started on IV Clindamycin here. No significant change in appearance thus general surgery was consulted and performed incisional debridement of right posterior shoulder abscess.  Initial wound cultures obtained on admission growing MRSA and wound cultures intraoperatively growing gram-positive cocci.  Due to MRSA infection, infectious disease was consulted with recommendations for continuing IV  "vancomycin while admitted and discharging on a course of p.o. Bactrim for an additional 2 weeks (it was felt that the patient did not fail previous Bactrim therapy but rather needed wider surgical debridement).     Pending Results   Unresulted Labs Ordered in the Past 30 Days of this Admission     Date and Time Order Name Status Description    7/29/2019 1636 Fluid Culture Aerobic Bacterial Preliminary     7/29/2019 1636 Anaerobic bacterial culture Preliminary     7/28/2019 1149 Blood culture Preliminary     7/28/2019 1141 Blood culture Preliminary         Code Status   Full Code       Primary Care Physician   Jennifer West    Exam:    /66 (BP Location: Right arm)   Pulse 97   Temp 96  F (35.6  C) (Oral)   Resp 16   Ht 1.676 m (5' 6\")   Wt 115.2 kg (254 lb)   SpO2 97%   Breastfeeding? No   BMI 41.00 kg/m    GEN:  Alert, oriented x 3, appears comfortable, NAD.  HEENT:  Normocephalic/atraumatic, no scleral icterus, no nasal discharge, mouth moist.  CV:  Regular rate and rhythm, no murmur or JVD.  S1 + S2 noted, no S3 or S4.  LUNGS:  Clear to auscultation bilaterally without rales/rhonchi/wheezing/retractions.  Symmetric chest rise on inhalation noted.  ABD:  Active bowel sounds, soft, non-tender/non-distended.  No rebound/guarding/rigidity.  EXT: right shoulder: wound evaluated by surgery the day of discharge and gauz in place during exam. No edema.  No cyanosis.  No acute joint synovitis noted.  SKIN:  Dry to touch, no exanthems noted in the visualized areas.    Discharge Disposition   Discharged to home    Consultations This Hospital Stay   PHARMACY TO DOSE VANCO  SURGERY GENERAL IP CONSULT  INFECTIOUS DISEASES IP CONSULT  PHARMACY TO DOSE VANCO    Time Spent on this Encounter   I, Yancy Harrington, personally saw the patient today and spent greater than 30 minutes discharging this patient.    Discharge Orders      WOUND CARE SUPPLIES    One inch nuguaze packing strips, long q tips, 4x4 guaze     Shower "    No shower for 24 hours post procedure. May shower on post-op day  1     Reason for your hospital stay    You were admitted due to concerns for right shoulder abscess with surrounding cellulitis. You were initially treated with IV antibiotics and due to minimal improvement you were evaluated by general surgery who opted to take you to the OR for additional incision and drainage. Your initial wound culture is growing MRSA (methicillin resistant staph aureus) and due to this you received a dose of IV Vancomycin and evaluated by infectious disease that felt you could discharge on a 2 week course of oral Bactrim. You should continue dressing changes and follow up with surgery per their recommendations.     Follow-up and recommended labs and tests     Follow up with primary care provider, Jennifer West, within 7 days for hospital follow- up.  No follow up labs or test are needed.  Follow up with general surgery on Friday per their recommendations for wound evaluation.     Activity    Your activity upon discharge: activity as tolerated     Full Code     Diet    Follow this diet upon discharge: Orders Placed This Encounter      Advance Diet as Tolerated: Regular Diet Adult     Discharge Medications   Discharge Medication List as of 7/30/2019  2:49 PM      START taking these medications    Details   HYDROcodone-acetaminophen (NORCO) 5-325 MG tablet Take 1-2 tablets by mouth every 4 hours as needed for moderate to severe pain, Disp-15 tablet, R-0, Local Print         CONTINUE these medications which have CHANGED    Details   sulfamethoxazole-trimethoprim (BACTRIM DS/SEPTRA DS) 800-160 MG tablet Take 1 tablet by mouth 2 times daily for 14 days, Disp-28 tablet, R-0, E-Prescribe         CONTINUE these medications which have NOT CHANGED    Details   albuterol (ALBUTEROL) 108 (90 BASE) MCG/ACT inhaler Inhale 2 puffs into the lungs every 4 hours as needed for shortness of breath / dyspnea, Disp-1 Inhaler, R-0, Local Print       buPROPion (WELLBUTRIN XL) 150 MG 24 hr tablet Take 150 mg by mouth every morning, Historical      ibuprofen (ADVIL/MOTRIN) 200 MG tablet Take 200 mg by mouth every 6 hours as needed for mild pain, Historical      levonorgestrel (MIRENA) 20 MCG/24HR IUD 1 each by Intrauterine route once Placed 3 years agoHistorical      levothyroxine (SYNTHROID/LEVOTHROID) 112 MCG tablet Take 112 mcg by mouth daily, Historical      sertraline (ZOLOFT) 50 MG tablet Take 50 mg by mouth daily, R-3, Historical      ranitidine (ZANTAC) 150 MG tablet Take 150 mg by mouth 2 times daily as needed for heartburn , Historical           Allergies   Allergies   Allergen Reactions     Penicillin G Hives     Head to toe hives     Data   Results for orders placed or performed during the hospital encounter of 07/28/19   POC US SOFT TISSUE    Impression    Limited Soft Tissue Ultrasound, performed and interpreted by me.    Indication:  Skin redness warmth pain. Evaluate for cellulitis vs abscess.     Body location: back    Findings:  There is cobblestoning suggestive of cellulitis in the evaluated area. There is no fluid collection to suggest abscess. No foreign body identified    IMPRESSION: Cellulitis.         UA with Microscopic   Result Value Ref Range    Color Urine Yellow     Appearance Urine Slightly Cloudy     Glucose Urine Negative NEG^Negative mg/dL    Bilirubin Urine Negative NEG^Negative    Ketones Urine Negative NEG^Negative mg/dL    Specific Gravity Urine 1.021 1.003 - 1.035    Blood Urine Negative NEG^Negative    pH Urine 6.5 5.0 - 7.0 pH    Protein Albumin Urine 10 (A) NEG^Negative mg/dL    Urobilinogen mg/dL Normal 0.0 - 2.0 mg/dL    Nitrite Urine Negative NEG^Negative    Leukocyte Esterase Urine Large (A) NEG^Negative    Source Midstream Urine     WBC Urine 5 0 - 5 /HPF    RBC Urine 3 (H) 0 - 2 /HPF    Squamous Epithelial /HPF Urine 8 (H) 0 - 1 /HPF    Mucous Urine Present (A) NEG^Negative /LPF   CRP inflammation   Result Value Ref  Range    CRP Inflammation 83.0 (H) 0.0 - 8.0 mg/L   Basic metabolic panel   Result Value Ref Range    Sodium 136 133 - 144 mmol/L    Potassium 3.7 3.4 - 5.3 mmol/L    Chloride 107 94 - 109 mmol/L    Carbon Dioxide 24 20 - 32 mmol/L    Anion Gap 5 3 - 14 mmol/L    Glucose 134 (H) 70 - 99 mg/dL    Urea Nitrogen 9 7 - 30 mg/dL    Creatinine 0.64 0.52 - 1.04 mg/dL    GFR Estimate >90 >60 mL/min/[1.73_m2]    GFR Estimate If Black >90 >60 mL/min/[1.73_m2]    Calcium 8.8 8.5 - 10.1 mg/dL   CBC with platelets differential   Result Value Ref Range    WBC 7.0 4.0 - 11.0 10e9/L    RBC Count 4.63 3.8 - 5.2 10e12/L    Hemoglobin 14.5 11.7 - 15.7 g/dL    Hematocrit 43.9 35.0 - 47.0 %    MCV 95 78 - 100 fl    MCH 31.3 26.5 - 33.0 pg    MCHC 33.0 31.5 - 36.5 g/dL    RDW 14.4 10.0 - 15.0 %    Platelet Count 169 150 - 450 10e9/L    Diff Method Automated Method     % Neutrophils 57.0 %    % Lymphocytes 35.3 %    % Monocytes 7.3 %    % Eosinophils 0.0 %    % Basophils 0.3 %    % Immature Granulocytes 0.1 %    Nucleated RBCs 0 0 /100    Absolute Neutrophil 4.0 1.6 - 8.3 10e9/L    Absolute Lymphocytes 2.5 0.8 - 5.3 10e9/L    Absolute Monocytes 0.5 0.0 - 1.3 10e9/L    Absolute Eosinophils 0.0 0.0 - 0.7 10e9/L    Absolute Basophils 0.0 0.0 - 0.2 10e9/L    Abs Immature Granulocytes 0.0 0 - 0.4 10e9/L    Absolute Nucleated RBC 0.0    Procalcitonin   Result Value Ref Range    Procalcitonin <0.05 ng/ml   Surgical pathology exam   Result Value Ref Range    Copath Report       Patient Name: COOKIE CHANCE  MR#: [id number]  Specimen #: [id number]  Collected: 7/29/2019  Received: 7/30/2019  Reported: 7/31/2019 09:59  Ordering Phy(s): ANTHONY DRIVER    For improved result formatting, select 'View Enhanced Report Format' under   Linked Documents section.    SPECIMEN(S):  Tissue, right posterior shoulder abscess cavity    FINAL DIAGNOSIS:  Tissue, right posterior shoulder, abscess cavity, debridement.  - Adipose and connective tissues with acute  "neutrophilic inflammation and   areas of necrosis consistent with  abscess.  Bacterial cocci forms present.  Negative for malignancy.    Electronically signed out by:    Cassius Macdonald M.D.    CLINICAL HISTORY:  Access.    GROSS:  The specimen is received in formalin labeled with the patient's name,   identifying information and designated  \"right posterior shoulder abscess cavity\".  It consists of a 1.5 x 1 x 0.8   cm in aggregate of red-brown,  friable tissue fragment.  Submitted entirely in one block. (Dictated b y:   TERRENCE Vance 7/30/2019 10:23 AM)    MICROSCOPIC:  Microscopic evaluation performed.    The technical component of this testing was completed at the [facility], with the professional component performed   at the [facility], [address], [place]  [address]   ([phone])    CPT Codes:  A: 92598-JV7    COLLECTION SITE:  Client: [facility]  Location: [facility] (R)       EKG 12-lead, tracing only   Result Value Ref Range    Interpretation ECG Click View Image link to view waveform and result    ISTAT HCG Quantitative Pregnancy POCT   Result Value Ref Range    HCG Quantitative Serum <5.0 <5.0 IU/L   ISTAT gases lactate maureen POCT   Result Value Ref Range    Ph Venous 7.38 7.32 - 7.43 pH    PCO2 Venous 38 (L) 40 - 50 mm Hg    PO2 Venous 37 25 - 47 mm Hg    Bicarbonate Venous 22 21 - 28 mmol/L    O2 Sat Venous 69 %    Lactic Acid 0.6 (L) 0.7 - 2.1 mmol/L   Blood culture   Result Value Ref Range    Specimen Description Blood Left Arm     Special Requests Aerobic and anaerobic bottles received     Culture Micro No growth after 3 days    Blood culture   Result Value Ref Range    Specimen Description Right Arm     Special Requests Aerobic and anaerobic bottles received     Culture Micro No growth after 3 days    Wound Culture Aerobic Bacterial   Result Value Ref Range    Specimen Description " Back Wound Abscess     Special Requests Specimen collected in eSwab transport (white cap)     Culture Micro (A)      Heavy growth  Methicillin resistant Staphylococcus aureus (MRSA)      Culture Micro       Critical Value/Significant Value, preliminary result only, called to and read back by  REDD GARNER, KRISTA [id number] 7.29.19 NDP         Susceptibility    Methicillin resistant staphylococcus aureus (mrsa) - SHANEL     CLINDAMYCIN* <=0.25 Sensitive ug/mL      * This isolate DOES NOT demonstrate inducible clindamycin resistance in vitro. Clindamycin is susceptible and could be used when indicated, however, erythromycin is resistant and should not be used.     ERYTHROMYCIN >=8 Resistant ug/mL     GENTAMICIN <=0.5 Sensitive ug/mL     OXACILLIN >=4 Resistant ug/mL     TETRACYCLINE <=1 Sensitive ug/mL     Trimethoprim/Sulfa <=0.5/9.5 Sensitive ug/mL     VANCOMYCIN <=0.5 Sensitive ug/mL     LINEZOLID 2 Sensitive ug/mL   Gram stain   Result Value Ref Range    Specimen Description Back Wound Abscess     Special Requests Specimen collected in eSwab transport (white cap)     Gram Stain Moderate  Gram positive cocci in clusters   (A)     Gram Stain Moderate  WBC'S seen  predominantly PMN's       Gram Stain       Called to  Kellen Aguilar RN at 1940 on 7/28/19 by GIANFRANCO.     Anaerobic bacterial culture   Result Value Ref Range    Specimen Description Right Shoulder Abscess SPECIMEN 1     Special Requests Received in anaerobic tubes.     Culture Micro Culture negative monitoring continues    Fluid Culture Aerobic Bacterial   Result Value Ref Range    Specimen Description Right Shoulder Abscess     Culture Micro (A)      Heavy growth  Staphylococcus aureus  Susceptibility testing in progress     Gram stain   Result Value Ref Range    Specimen Description Right Shoulder Abscess     Gram Stain Moderate  Gram positive cocci in clusters   (A)     Gram Stain Many  WBC'S seen  predominantly PMN's        Yancy Harrington PA-C

## 2019-08-02 ENCOUNTER — OFFICE VISIT (OUTPATIENT)
Dept: SURGERY | Facility: CLINIC | Age: 34
End: 2019-08-02
Payer: COMMERCIAL

## 2019-08-02 VITALS
RESPIRATION RATE: 16 BRPM | HEIGHT: 66 IN | WEIGHT: 254 LBS | DIASTOLIC BLOOD PRESSURE: 70 MMHG | OXYGEN SATURATION: 98 % | SYSTOLIC BLOOD PRESSURE: 104 MMHG | HEART RATE: 109 BPM | BODY MASS INDEX: 40.82 KG/M2

## 2019-08-02 DIAGNOSIS — G89.18 ACUTE POST-OPERATIVE PAIN: ICD-10-CM

## 2019-08-02 DIAGNOSIS — Z09 SURGICAL FOLLOWUP VISIT: Primary | ICD-10-CM

## 2019-08-02 PROCEDURE — 99024 POSTOP FOLLOW-UP VISIT: CPT | Performed by: PHYSICIAN ASSISTANT

## 2019-08-02 RX ORDER — OXYCODONE HYDROCHLORIDE 5 MG/1
5 TABLET ORAL EVERY 6 HOURS PRN
Qty: 15 TABLET | Refills: 0 | Status: SHIPPED | OUTPATIENT
Start: 2019-08-02 | End: 2019-08-13

## 2019-08-02 ASSESSMENT — MIFFLIN-ST. JEOR: SCORE: 1868.89

## 2019-08-02 NOTE — PROGRESS NOTES
[facility]     Subjective:  Daxa Wilkes is here for her first postoperative visit. She underwent a excisional debridement of a left shoulder abscess by Dr. Hilton on 7/29/19. Today she tells me she has been feeling well since surgery. She is cleaning her wound daily in the shower and her father in law is packing the wound and covering with gauze in a wet-to-dry fashion. She has some tenderness during wound care and is using oxycodone to control the pain. She requests a refill.    Objective:  Wound - looks good, clean, no bleeding, serosang drainage, no granulation tissue see yet.   Approximately 2cm wide x 1.8cm long x 3cm deep    Assessment:  S/p excisional debridement. The pathology confirms inflamed tissue and necrosis consistent with abscess .    Plan:  Limited wound supplies given. Info on where to get supplies given to patient.  Rx: oxycodone #15  RTC 2 weeks for wound check, sooner PRN      Yossi Mi PA-C  8/2/2019      Please route or send letter to:  Primary Care Provider (PCP)

## 2019-08-03 LAB
BACTERIA SPEC CULT: NO GROWTH
BACTERIA SPEC CULT: NO GROWTH
Lab: NORMAL
Lab: NORMAL
SPECIMEN SOURCE: NORMAL
SPECIMEN SOURCE: NORMAL

## 2019-08-05 ENCOUNTER — MYC MEDICAL ADVICE (OUTPATIENT)
Dept: SURGERY | Facility: CLINIC | Age: 34
End: 2019-08-05

## 2019-08-07 LAB
BACTERIA SPEC CULT: ABNORMAL
BACTERIA SPEC CULT: ABNORMAL
Lab: ABNORMAL
SPECIMEN SOURCE: ABNORMAL

## 2019-08-13 ENCOUNTER — OFFICE VISIT (OUTPATIENT)
Dept: SURGERY | Facility: CLINIC | Age: 34
End: 2019-08-13
Payer: COMMERCIAL

## 2019-08-13 VITALS
OXYGEN SATURATION: 98 % | HEIGHT: 66 IN | DIASTOLIC BLOOD PRESSURE: 68 MMHG | WEIGHT: 254 LBS | BODY MASS INDEX: 40.82 KG/M2 | SYSTOLIC BLOOD PRESSURE: 108 MMHG | HEART RATE: 104 BPM | RESPIRATION RATE: 16 BRPM

## 2019-08-13 DIAGNOSIS — Z09 SURGICAL FOLLOWUP VISIT: Primary | ICD-10-CM

## 2019-08-13 DIAGNOSIS — A49.02 MRSA INFECTION: ICD-10-CM

## 2019-08-13 DIAGNOSIS — T14.8XXA OPEN WOUND: ICD-10-CM

## 2019-08-13 PROCEDURE — 99212 OFFICE O/P EST SF 10 MIN: CPT | Performed by: PHYSICIAN ASSISTANT

## 2019-08-13 ASSESSMENT — MIFFLIN-ST. JEOR: SCORE: 1868.89

## 2019-08-13 NOTE — PROGRESS NOTES
"[facility] Note     Subjective:  Daxa Wilkes is here for wound care. She underwent a excisional debridement of a left shoulder abscess by Dr. Hilton on 7/29/19.  +MRSA.    -Continues use of Bactrim; finishes this on 8/16.  -Her father-in-law has been assisting her with dressing changes: 1\"packing tape with overlay gauze/tape.  They have noticed minimal bleeding from the wound only after very active days with lots of right shoulder use.  No fevers/chills/sweats.  -She had taken Norco for pain during dressing changes, at her last appt she received oxycodone which actually seemed less effective, so she stopped using it.  We discussed narcotic use/side-effects, and trying OTC meds at this point.     Objective:  Wound - looks good, clean, nicely granulating across entire wound bed without excessive granulation tissue build-up, scant bleeding at inferolateral wound base.  Undermining noted at superior margin of the wound.  Cleansed with saline.  Wound measurements:  L=3cm, W=2cm, D=1.3cm  These measurements are actually a bit larger than those taken at the last appt, therefore I discussed packing the wound space less tightly to allow improved healing and circulation.    Assessment:  S/p excisional debridement. The pathology confirms abscess, +MRSA    Plan:  -Remainder of 1/2\" packing tape bottle given to patient to transition to when the 1\" packing tape is too large for the wound.  -Recommended to pack the wound loosely.   -Pain meds: patient will try acetaminophen and ibuprofen for dressing changes.  -RTC 2 weeks for wound check, sooner PRN    Cat Queen PA-C      Please route or send letter to:  None    "

## 2019-08-27 ENCOUNTER — OFFICE VISIT (OUTPATIENT)
Dept: SURGERY | Facility: CLINIC | Age: 34
End: 2019-08-27
Payer: COMMERCIAL

## 2019-08-27 VITALS
WEIGHT: 254 LBS | OXYGEN SATURATION: 99 % | RESPIRATION RATE: 16 BRPM | SYSTOLIC BLOOD PRESSURE: 110 MMHG | DIASTOLIC BLOOD PRESSURE: 68 MMHG | HEIGHT: 66 IN | BODY MASS INDEX: 40.82 KG/M2 | HEART RATE: 98 BPM

## 2019-08-27 DIAGNOSIS — Z09 FOLLOW-UP EXAMINATION FOLLOWING SURGERY: Primary | ICD-10-CM

## 2019-08-27 PROCEDURE — 99212 OFFICE O/P EST SF 10 MIN: CPT | Performed by: PHYSICIAN ASSISTANT

## 2019-08-27 ASSESSMENT — MIFFLIN-ST. JEOR: SCORE: 1868.89

## 2019-08-27 NOTE — PROGRESS NOTES
"[facility] Note   8/27/2019      Subjective:  Daxa Wilkes is here for wound care. She underwent a excisional debridement of a left shoulder abscess by Dr. Hilton on 7/29/19.  +MRSA.    -Completed full course of Bactrim as instructed  -Her father-in-law has been assisting her with dressing changes: 1/2\"packing tape with overlay gauze/tape.  They have noticed minimal bleeding from the wound only after very active days with lots of right shoulder use.  No fevers/chills/sweats. She reports that the packing has been falling out due to the wound becoming shallow.   -does not require pain medication    Objective:  Wound - looks good, clean, nicely granulating across entire wound bed without excessive granulation tissue build-up, without bleeding. Cleansed with saline.  Wound measurements: D: <1cm      Assessment:  S/p excisional debridement. The pathology confirms abscess, +MRSA    Plan:  -Recommended covering the wound with saline soaked guaze to keep it moist  -RTC 2 weeks for wound check, sooner PRN  -Patient is in agreement with this plan    Lanie South PA-C      Please route or send letter to:  None  "

## 2019-09-11 ENCOUNTER — OFFICE VISIT (OUTPATIENT)
Dept: SURGERY | Facility: CLINIC | Age: 34
End: 2019-09-11
Payer: COMMERCIAL

## 2019-09-11 VITALS
HEIGHT: 66 IN | OXYGEN SATURATION: 97 % | HEART RATE: 104 BPM | BODY MASS INDEX: 40.82 KG/M2 | SYSTOLIC BLOOD PRESSURE: 104 MMHG | WEIGHT: 254 LBS | RESPIRATION RATE: 16 BRPM | DIASTOLIC BLOOD PRESSURE: 68 MMHG

## 2019-09-11 DIAGNOSIS — Z09 SURGICAL FOLLOWUP VISIT: Primary | ICD-10-CM

## 2019-09-11 PROCEDURE — 99212 OFFICE O/P EST SF 10 MIN: CPT | Performed by: PHYSICIAN ASSISTANT

## 2019-09-11 ASSESSMENT — MIFFLIN-ST. JEOR: SCORE: 1868.89

## 2019-09-11 NOTE — PROGRESS NOTES
[facility] Note   9/11/2019        Subjective:  Daxa Wilkes is here for wound care. She underwent a excisional debridement of a left shoulder abscess by Dr. Hilton on 7/29/19.  +MRSA.    -Completed full course of Bactrim as instructed  -Wound cares: saline moistened overlay guaze.   -does not require pain medication    Objective:  Wound - well healed, epithelialized. Without any signs of infection.     Assessment:  S/p excisional debridement. The pathology confirms abscess, +MRSA    Plan:  - no further wound cares needed  -call or RTC PRN    Lanie South PA-C      Please route or send letter to:  None

## 2019-09-11 NOTE — LETTER
2019    RE: Daxa Wilkes, : 1985         Subjective:  Daxa Wilkes is here for wound care. She underwent a excisional debridement of a left shoulder abscess by Dr. Hilton on 19.  +MRSA.     -Completed full course of Bactrim as instructed  -Wound cares: saline moistened overlay guaze.   -does not require pain medication     Objective:  Wound - well healed, epithelialized. Without any signs of infection.      Assessment:  S/p excisional debridement. The pathology confirms abscess, +MRSA     Plan:  - no further wound cares needed  -call or RTC PRN     Lanie South PA-C

## 2019-09-19 ENCOUNTER — HOSPITAL ENCOUNTER (EMERGENCY)
Facility: CLINIC | Age: 34
Discharge: HOME | End: 2019-09-19
Attending: EMERGENCY MEDICINE | Admitting: EMERGENCY MEDICINE
Payer: COMMERCIAL

## 2019-09-19 ENCOUNTER — APPOINTMENT (OUTPATIENT)
Dept: CT IMAGING | Facility: CLINIC | Age: 34
End: 2019-09-19
Attending: EMERGENCY MEDICINE
Payer: COMMERCIAL

## 2019-09-19 ENCOUNTER — APPOINTMENT (OUTPATIENT)
Dept: ULTRASOUND IMAGING | Facility: CLINIC | Age: 34
End: 2019-09-19
Attending: EMERGENCY MEDICINE
Payer: COMMERCIAL

## 2019-09-19 VITALS
TEMPERATURE: 98.8 F | DIASTOLIC BLOOD PRESSURE: 77 MMHG | WEIGHT: 244 LBS | HEART RATE: 93 BPM | HEIGHT: 66 IN | OXYGEN SATURATION: 98 % | SYSTOLIC BLOOD PRESSURE: 114 MMHG | RESPIRATION RATE: 18 BRPM | BODY MASS INDEX: 39.21 KG/M2

## 2019-09-19 DIAGNOSIS — K80.20 CALCULUS OF GALLBLADDER WITHOUT CHOLECYSTITIS WITHOUT OBSTRUCTION: ICD-10-CM

## 2019-09-19 LAB
ALBUMIN SERPL-MCNC: 3.9 G/DL (ref 3.4–5)
ALBUMIN UR-MCNC: NEGATIVE MG/DL
ALP SERPL-CCNC: 85 U/L (ref 40–150)
ALT SERPL W P-5'-P-CCNC: 37 U/L (ref 0–50)
ANION GAP SERPL CALCULATED.3IONS-SCNC: 5 MMOL/L (ref 3–14)
APPEARANCE UR: CLEAR
AST SERPL W P-5'-P-CCNC: 25 U/L (ref 0–45)
BACTERIA #/AREA URNS HPF: ABNORMAL /HPF
BASOPHILS # BLD AUTO: 0 10E9/L (ref 0–0.2)
BASOPHILS NFR BLD AUTO: 0 %
BILIRUB SERPL-MCNC: 0.4 MG/DL (ref 0.2–1.3)
BILIRUB UR QL STRIP: NEGATIVE
BUN SERPL-MCNC: 12 MG/DL (ref 7–30)
CALCIUM SERPL-MCNC: 8.8 MG/DL (ref 8.5–10.1)
CHLORIDE SERPL-SCNC: 107 MMOL/L (ref 94–109)
CO2 SERPL-SCNC: 27 MMOL/L (ref 20–32)
COLOR UR AUTO: ABNORMAL
CREAT SERPL-MCNC: 0.72 MG/DL (ref 0.52–1.04)
DIFFERENTIAL METHOD BLD: NORMAL
EOSINOPHIL # BLD AUTO: 0 10E9/L (ref 0–0.7)
EOSINOPHIL NFR BLD AUTO: 0 %
ERYTHROCYTE [DISTWIDTH] IN BLOOD BY AUTOMATED COUNT: 13.2 % (ref 10–15)
GFR SERPL CREATININE-BSD FRML MDRD: >90 ML/MIN/{1.73_M2}
GLUCOSE SERPL-MCNC: 113 MG/DL (ref 70–99)
GLUCOSE UR STRIP-MCNC: NEGATIVE MG/DL
HCG UR QL: NEGATIVE
HCT VFR BLD AUTO: 42.8 % (ref 35–47)
HGB BLD-MCNC: 13.8 G/DL (ref 11.7–15.7)
HGB UR QL STRIP: NEGATIVE
IMM GRANULOCYTES # BLD: 0 10E9/L (ref 0–0.4)
IMM GRANULOCYTES NFR BLD: 0.4 %
KETONES UR STRIP-MCNC: NEGATIVE MG/DL
LEUKOCYTE ESTERASE UR QL STRIP: ABNORMAL
LIPASE SERPL-CCNC: 102 U/L (ref 73–393)
LYMPHOCYTES # BLD AUTO: 1.3 10E9/L (ref 0.8–5.3)
LYMPHOCYTES NFR BLD AUTO: 27.1 %
MCH RBC QN AUTO: 30.7 PG (ref 26.5–33)
MCHC RBC AUTO-ENTMCNC: 32.2 G/DL (ref 31.5–36.5)
MCV RBC AUTO: 95 FL (ref 78–100)
MONOCYTES # BLD AUTO: 0.4 10E9/L (ref 0–1.3)
MONOCYTES NFR BLD AUTO: 7.3 %
MUCOUS THREADS #/AREA URNS LPF: PRESENT /LPF
NEUTROPHILS # BLD AUTO: 3.2 10E9/L (ref 1.6–8.3)
NEUTROPHILS NFR BLD AUTO: 65.2 %
NITRATE UR QL: NEGATIVE
NRBC # BLD AUTO: 0 10*3/UL
NRBC BLD AUTO-RTO: 0 /100
PH UR STRIP: 6 PH (ref 5–7)
PLATELET # BLD AUTO: 167 10E9/L (ref 150–450)
POTASSIUM SERPL-SCNC: 3.7 MMOL/L (ref 3.4–5.3)
PROT SERPL-MCNC: 7.4 G/DL (ref 6.8–8.8)
RBC # BLD AUTO: 4.49 10E12/L (ref 3.8–5.2)
RBC #/AREA URNS AUTO: 2 /HPF (ref 0–2)
SODIUM SERPL-SCNC: 139 MMOL/L (ref 133–144)
SOURCE: ABNORMAL
SP GR UR STRIP: 1.02 (ref 1–1.03)
SQUAMOUS #/AREA URNS AUTO: 4 /HPF (ref 0–1)
UROBILINOGEN UR STRIP-MCNC: NORMAL MG/DL (ref 0–2)
WBC # BLD AUTO: 4.9 10E9/L (ref 4–11)
WBC #/AREA URNS AUTO: 4 /HPF (ref 0–5)

## 2019-09-19 PROCEDURE — 96361 HYDRATE IV INFUSION ADD-ON: CPT

## 2019-09-19 PROCEDURE — 76705 ECHO EXAM OF ABDOMEN: CPT

## 2019-09-19 PROCEDURE — 74177 CT ABD & PELVIS W/CONTRAST: CPT

## 2019-09-19 PROCEDURE — 99285 EMERGENCY DEPT VISIT HI MDM: CPT | Mod: 25

## 2019-09-19 PROCEDURE — 85025 COMPLETE CBC W/AUTO DIFF WBC: CPT | Performed by: EMERGENCY MEDICINE

## 2019-09-19 PROCEDURE — 96375 TX/PRO/DX INJ NEW DRUG ADDON: CPT

## 2019-09-19 PROCEDURE — 81025 URINE PREGNANCY TEST: CPT | Performed by: EMERGENCY MEDICINE

## 2019-09-19 PROCEDURE — 96374 THER/PROPH/DIAG INJ IV PUSH: CPT | Mod: 59

## 2019-09-19 PROCEDURE — 25000128 H RX IP 250 OP 636: Performed by: EMERGENCY MEDICINE

## 2019-09-19 PROCEDURE — 25000125 ZZHC RX 250: Performed by: EMERGENCY MEDICINE

## 2019-09-19 PROCEDURE — 80053 COMPREHEN METABOLIC PANEL: CPT | Performed by: EMERGENCY MEDICINE

## 2019-09-19 PROCEDURE — 83690 ASSAY OF LIPASE: CPT | Performed by: EMERGENCY MEDICINE

## 2019-09-19 PROCEDURE — 81001 URINALYSIS AUTO W/SCOPE: CPT | Performed by: EMERGENCY MEDICINE

## 2019-09-19 PROCEDURE — 87086 URINE CULTURE/COLONY COUNT: CPT | Performed by: EMERGENCY MEDICINE

## 2019-09-19 RX ORDER — HYDROCODONE BITARTRATE AND ACETAMINOPHEN 5; 325 MG/1; MG/1
1 TABLET ORAL EVERY 6 HOURS PRN
Qty: 10 TABLET | Refills: 0 | Status: SHIPPED | OUTPATIENT
Start: 2019-09-19 | End: 2019-09-20

## 2019-09-19 RX ORDER — KETOROLAC TROMETHAMINE 15 MG/ML
15 INJECTION, SOLUTION INTRAMUSCULAR; INTRAVENOUS ONCE
Status: COMPLETED | OUTPATIENT
Start: 2019-09-19 | End: 2019-09-19

## 2019-09-19 RX ORDER — IOPAMIDOL 755 MG/ML
500 INJECTION, SOLUTION INTRAVASCULAR ONCE
Status: COMPLETED | OUTPATIENT
Start: 2019-09-19 | End: 2019-09-19

## 2019-09-19 RX ORDER — ONDANSETRON 4 MG/1
4 TABLET, ORALLY DISINTEGRATING ORAL EVERY 8 HOURS PRN
Qty: 10 TABLET | Refills: 0 | Status: SHIPPED | OUTPATIENT
Start: 2019-09-19 | End: 2019-09-22

## 2019-09-19 RX ORDER — ONDANSETRON 2 MG/ML
4 INJECTION INTRAMUSCULAR; INTRAVENOUS ONCE
Status: COMPLETED | OUTPATIENT
Start: 2019-09-19 | End: 2019-09-19

## 2019-09-19 RX ORDER — SODIUM CHLORIDE 9 MG/ML
1000 INJECTION, SOLUTION INTRAVENOUS CONTINUOUS
Status: DISCONTINUED | OUTPATIENT
Start: 2019-09-19 | End: 2019-09-19 | Stop reason: HOSPADM

## 2019-09-19 RX ADMIN — SODIUM CHLORIDE 1000 ML: 9 INJECTION, SOLUTION INTRAVENOUS at 05:40

## 2019-09-19 RX ADMIN — IOPAMIDOL 100 ML: 755 INJECTION, SOLUTION INTRAVENOUS at 06:25

## 2019-09-19 RX ADMIN — KETOROLAC TROMETHAMINE 15 MG: 15 INJECTION, SOLUTION INTRAMUSCULAR; INTRAVENOUS at 05:40

## 2019-09-19 RX ADMIN — ONDANSETRON HYDROCHLORIDE 4 MG: 2 INJECTION, SOLUTION INTRAMUSCULAR; INTRAVENOUS at 05:40

## 2019-09-19 RX ADMIN — SODIUM CHLORIDE 65 ML: 9 INJECTION, SOLUTION INTRAVENOUS at 06:25

## 2019-09-19 ASSESSMENT — ENCOUNTER SYMPTOMS
VOMITING: 1
DIFFICULTY URINATING: 0
ABDOMINAL PAIN: 1
CONSTIPATION: 0
NAUSEA: 1
BACK PAIN: 0

## 2019-09-19 ASSESSMENT — MIFFLIN-ST. JEOR: SCORE: 1823.53

## 2019-09-19 NOTE — ED PROVIDER NOTES
Afebrile and well-appearing 33-year-old female presented with a couple week history of generalized abdominal pain.  She was initially seen by my colleague and I was asked to follow-up on the results of a limited right upper quadrant ultrasound.  Work-up to that point had included a normal white blood cell count and comprehensive metabolic battery.  Negative urinalysis and pregnancy test.  CT abdomen and pelvis demonstrated cholelithiasis, a new finding, with mild gallbladder wall thickening.  The ultrasound shows the same.  She has gallbladder wall thickening and a positive sonographic Troy sign.  On my initial encounter with her she appears well and is reporting minimal discomfort.  The discomfort is fairly diffuse including the low abdomen and right upper quadrant.  Clinically I do not believe this represents a true occult acute cholecystitis and I do not believe that emergency intervention is necessary.  She herself feels comfortable going home and I recommended early outpatient follow-up with general surgery for consultation and discussion of imaging findings and probable non-emergent laparoscopic cholecystectomy.  She was however instructed to return if she develops a fever, vomiting, escalating right upper quadrant abdominal pain, or concerns.     Gulshan Perdomo MD  09/19/19 [id number]

## 2019-09-19 NOTE — ED TRIAGE NOTES
Pt to ER with c/o mid diffuse abd pain with n/v this a.m., pt had had the pain on and off for 2 weeks

## 2019-09-19 NOTE — ED AVS SNAPSHOT
[facility]  [address]  [facility] [address]  Phone:  [phone]  Fax:  [phone]                                    Daxa Wilkes   MRN: [id number]    Department:  [facility]   Date of Visit:  9/19/2019           After Visit Summary Signature Page    I have received my discharge instructions, and my questions have been answered. I have discussed any challenges I see with this plan with the nurse or doctor.    ..........................................................................................................................................  Patient/Patient Representative Signature      ..........................................................................................................................................  Patient Representative Print Name and Relationship to Patient    ..................................................               ................................................  Date                                   Time    ..........................................................................................................................................  Reviewed by Signature/Title    ...................................................              ..............................................  Date                                               Time          22EPIC Rev 08/18

## 2019-09-19 NOTE — ED PROVIDER NOTES
"  History     Chief Complaint:  Abdominal Pain    The history is provided by the patient.      Daxa Wilkes is a 33 year old female who presents with concerns for 2-weeks of intermittent generalized abdominal pain. She reports her pain worsened and radiated to her upper abdomen three hours prior and reports vomiting \"bile\" prior to arrival. She notes this has not been evaluated until tonight. Daxa denies any difficulty urinating, back pain, bloody stool, constipation, taking any analgesics, or prior abdominal surgeries with the exception for laparoscopic surgery for endometriosis.     Allergies:  Penicillin G    Medications:    Wellbutrin  Mirena  Synthroid  Zantac  Zoloft    Past Medical History:    Depression  GERD  HELLP syndrome  PUD  Thyroid disease    Past Surgical History:    I&D of right shoulder  endometriosis surgery    Family History:    Thyroid    Social History:  Never Smoker  Alcohol Use: No  Marital Status:       Review of Systems   Gastrointestinal: Positive for abdominal pain, nausea and vomiting. Negative for constipation.   Genitourinary: Negative for difficulty urinating.   Musculoskeletal: Negative for back pain.   All other systems reviewed and are negative.    Physical Exam     Patient Vitals for the past 24 hrs:   BP Temp Temp src Pulse Heart Rate Resp SpO2 Height Weight   09/19/19 0600 -- -- -- -- -- -- 97 % -- --   09/19/19 0545 -- -- -- -- 76 18 98 % -- --   09/19/19 0453 124/89 98.8  F (37.1  C) Temporal 82 -- 18 97 % 1.676 m (5' 6\") 110.7 kg (244 lb)     Physical Exam  Vitals signs reviewed.   HENT:      Head: Normocephalic.   Eyes:      General: No scleral icterus.  Cardiovascular:      Rate and Rhythm: Normal rate and regular rhythm.   Pulmonary:      Effort: Pulmonary effort is normal.   Abdominal:      Palpations: Abdomen is soft.      Tenderness: There is tenderness in the right upper quadrant and epigastric area.      Hernia: No hernia is present.   Genitourinary:     " Vagina: Normal.   Skin:     General: Skin is warm and dry.      Capillary Refill: Capillary refill takes less than 2 seconds.      Coloration: Skin is not jaundiced.   Neurological:      General: No focal deficit present.      Mental Status: She is alert.   Psychiatric:         Mood and Affect: Mood normal.           Emergency Department Course     Imaging:  Abdomen US, limited (RUQ only)   Final Result   IMPRESSION:  Findings concerning for acute cholecystitis with   gallbladder wall thickening, positive sonographic Troy sign and   multiple gallstones.      CINDY MELCHOR MD      CT Abdomen Pelvis w Contrast   Final Result   CONCLUSION:    1.  Cholelithiasis. The gallbladder wall appears mildly thickened. Consider ultrasound or hepatobiliary scan in further evaluation.   2.  Mild splenomegaly.   3.  No bowel obstruction or inflammation.                 Laboratory:  CBC: WBC 4.9, HGB 13.8,   CMP: glc 113 (H) o/w WNL (Creatinine 0.72)  Lipase: 102    UA: LE moderate (A) bacteria few (A) HPF 4 (H) mucous present (A) o/w WNL  UPT: Negative    Interventions:  0540: NS 1L IV Bolus   0540: Toradol 15 mg IV  0540: Zofran 4 mg IV    Emergency Department Course:  Nursing notes and vitals reviewed.  0500 I performed an exam of the patient as documented above.   0530 IV was inserted and blood was drawn for laboratory testing, results above.  0541 The patient provided a urine sample here in the emergency department. This was sent for laboratory testing, findings above.  0602 Patient reassessed and updated on work-up thus far.   0621 The patient was sent for a CT while in the emergency department, results above.       Impression & Plan    Medical Decision Making:  Daxa Wilkes is a 33 year old female who presents to the emergency department today for evaluation of epigastric pain both right upper and right lower quadrant.  Due to the diffuse nature of pain CT was recommended for further assessment.  There is evidence  of gallbladder wall thickening and concerns for gallbladder disease.  Patient was ordered an ultrasound and signed out to Dr. ePrdomo pending ultrasound results.  If no signs of cholecystitis by ultrasound consider discharge with pain medication and follow-up with general surgery.  Patient is pending ultrasound results and signed out to Dr. Perdomo pending these.  Pain was improved after interventions above.    Diagnosis:    ICD-10-CM    1. Calculus of gallbladder without cholecystitis without obstruction K80.20 Urine Culture Aerobic Bacterial       Disposition: Pending ultrasound results    Discharge Medications:     Review of your medicines      UNREVIEWED medicines. Ask your doctor about these medicines      Dose / Directions   albuterol 108 (90 Base) MCG/ACT inhaler  Commonly known as:  PROAIR HFA      Dose:  2 puff  Inhale 2 puffs into the lungs every 4 hours as needed for shortness of breath / dyspnea  Quantity:  1 Inhaler  Refills:  0     buPROPion 150 MG 24 hr tablet  Commonly known as:  WELLBUTRIN XL      Dose:  150 mg  Take 150 mg by mouth every morning  Refills:  0     ibuprofen 200 MG tablet  Commonly known as:  ADVIL/MOTRIN      Dose:  200 mg  Take 200 mg by mouth every 6 hours as needed for mild pain  Refills:  0     levonorgestrel 20 MCG/24HR IUD  Commonly known as:  MIRENA      Dose:  1 each  1 each by Intrauterine route once Placed 3 years ago  Refills:  0     levothyroxine 112 MCG tablet  Commonly known as:  SYNTHROID/LEVOTHROID      Dose:  112 mcg  Take 112 mcg by mouth daily  Refills:  0     ranitidine 150 MG tablet  Commonly known as:  ZANTAC      Dose:  150 mg  Take 150 mg by mouth 2 times daily as needed for heartburn  Refills:  0     sertraline 50 MG tablet  Commonly known as:  ZOLOFT      Dose:  50 mg  Take 50 mg by mouth daily  Refills:  3          Scribe Disclosure:  Abiodun CARTAGENA, am serving as a scribe at 4:59 AM on 9/19/2019 to document services personally performed by Gulshan Murray  MD Kenny based on my observations and the provider's statements to me.    [facility] EMERGENCY DEPARTMENT       Gulshan Murray MD  10/15/19 0785

## 2019-09-20 ENCOUNTER — ANESTHESIA (OUTPATIENT)
Dept: SURGERY | Facility: CLINIC | Age: 34
End: 2019-09-20
Payer: COMMERCIAL

## 2019-09-20 ENCOUNTER — HOSPITAL ENCOUNTER (OUTPATIENT)
Facility: CLINIC | Age: 34
Discharge: HOME | End: 2019-09-20
Attending: SURGERY | Admitting: SURGERY
Payer: COMMERCIAL

## 2019-09-20 ENCOUNTER — OFFICE VISIT (OUTPATIENT)
Dept: SURGERY | Facility: CLINIC | Age: 34
End: 2019-09-20
Payer: COMMERCIAL

## 2019-09-20 ENCOUNTER — ANESTHESIA EVENT (OUTPATIENT)
Dept: SURGERY | Facility: CLINIC | Age: 34
End: 2019-09-20
Payer: COMMERCIAL

## 2019-09-20 ENCOUNTER — APPOINTMENT (OUTPATIENT)
Dept: SURGERY | Facility: PHYSICIAN GROUP | Age: 34
End: 2019-09-20
Payer: COMMERCIAL

## 2019-09-20 ENCOUNTER — TELEPHONE (OUTPATIENT)
Dept: SURGERY | Facility: CLINIC | Age: 34
End: 2019-09-20

## 2019-09-20 VITALS
BODY MASS INDEX: 38.78 KG/M2 | SYSTOLIC BLOOD PRESSURE: 117 MMHG | WEIGHT: 241.3 LBS | HEART RATE: 93 BPM | OXYGEN SATURATION: 94 % | RESPIRATION RATE: 15 BRPM | DIASTOLIC BLOOD PRESSURE: 81 MMHG | HEIGHT: 66 IN | TEMPERATURE: 97.6 F

## 2019-09-20 VITALS
BODY MASS INDEX: 39.21 KG/M2 | SYSTOLIC BLOOD PRESSURE: 102 MMHG | WEIGHT: 244 LBS | DIASTOLIC BLOOD PRESSURE: 60 MMHG | RESPIRATION RATE: 16 BRPM | OXYGEN SATURATION: 99 % | HEART RATE: 77 BPM | HEIGHT: 66 IN

## 2019-09-20 DIAGNOSIS — K81.0 ACUTE CHOLECYSTITIS: Primary | ICD-10-CM

## 2019-09-20 DIAGNOSIS — Z90.49 S/P LAPAROSCOPIC CHOLECYSTECTOMY: Primary | ICD-10-CM

## 2019-09-20 LAB
BACTERIA SPEC CULT: NORMAL
Lab: NORMAL
SPECIMEN SOURCE: NORMAL

## 2019-09-20 PROCEDURE — 25800030 ZZH RX IP 258 OP 636: Performed by: ANESTHESIOLOGY

## 2019-09-20 PROCEDURE — 71000027 ZZH RECOVERY PHASE 2 EACH 15 MINS: Performed by: SURGERY

## 2019-09-20 PROCEDURE — 47562 LAPAROSCOPIC CHOLECYSTECTOMY: CPT | Mod: AS | Performed by: PHYSICIAN ASSISTANT

## 2019-09-20 PROCEDURE — 36000058 ZZH SURGERY LEVEL 3 EA 15 ADDTL MIN: Performed by: SURGERY

## 2019-09-20 PROCEDURE — 25000132 ZZH RX MED GY IP 250 OP 250 PS 637: Performed by: ANESTHESIOLOGY

## 2019-09-20 PROCEDURE — 99244 OFF/OP CNSLTJ NEW/EST MOD 40: CPT | Mod: 57 | Performed by: SURGERY

## 2019-09-20 PROCEDURE — 25000128 H RX IP 250 OP 636: Performed by: NURSE ANESTHETIST, CERTIFIED REGISTERED

## 2019-09-20 PROCEDURE — 71000014 ZZH RECOVERY PHASE 1 LEVEL 2 FIRST HR: Performed by: SURGERY

## 2019-09-20 PROCEDURE — 47562 LAPAROSCOPIC CHOLECYSTECTOMY: CPT | Mod: 57 | Performed by: SURGERY

## 2019-09-20 PROCEDURE — 25000125 ZZHC RX 250: Performed by: NURSE ANESTHETIST, CERTIFIED REGISTERED

## 2019-09-20 PROCEDURE — 37000008 ZZH ANESTHESIA TECHNICAL FEE, 1ST 30 MIN: Performed by: SURGERY

## 2019-09-20 PROCEDURE — 36000056 ZZH SURGERY LEVEL 3 1ST 30 MIN: Performed by: SURGERY

## 2019-09-20 PROCEDURE — 88304 TISSUE EXAM BY PATHOLOGIST: CPT | Performed by: SURGERY

## 2019-09-20 PROCEDURE — 27210794 ZZH OR GENERAL SUPPLY STERILE: Performed by: SURGERY

## 2019-09-20 PROCEDURE — 25000128 H RX IP 250 OP 636: Performed by: SURGERY

## 2019-09-20 PROCEDURE — 25800025 ZZH RX 258: Performed by: SURGERY

## 2019-09-20 PROCEDURE — 88304 TISSUE EXAM BY PATHOLOGIST: CPT | Mod: 26 | Performed by: SURGERY

## 2019-09-20 PROCEDURE — 37000009 ZZH ANESTHESIA TECHNICAL FEE, EACH ADDTL 15 MIN: Performed by: SURGERY

## 2019-09-20 PROCEDURE — 71000015 ZZH RECOVERY PHASE 1 LEVEL 2 EA ADDTL HR: Performed by: SURGERY

## 2019-09-20 PROCEDURE — 25000128 H RX IP 250 OP 636: Performed by: ANESTHESIOLOGY

## 2019-09-20 PROCEDURE — 40000306 ZZH STATISTIC PRE PROC ASSESS II: Performed by: SURGERY

## 2019-09-20 RX ORDER — CEFAZOLIN SODIUM 1 G/3ML
1 INJECTION, POWDER, FOR SOLUTION INTRAMUSCULAR; INTRAVENOUS SEE ADMIN INSTRUCTIONS
Status: DISCONTINUED | OUTPATIENT
Start: 2019-09-20 | End: 2019-09-20 | Stop reason: HOSPADM

## 2019-09-20 RX ORDER — LIDOCAINE HYDROCHLORIDE 10 MG/ML
INJECTION, SOLUTION INFILTRATION; PERINEURAL PRN
Status: DISCONTINUED | OUTPATIENT
Start: 2019-09-20 | End: 2019-09-20

## 2019-09-20 RX ORDER — OXYCODONE HYDROCHLORIDE 5 MG/1
10 TABLET ORAL
Status: DISCONTINUED | OUTPATIENT
Start: 2019-09-20 | End: 2019-09-20 | Stop reason: HOSPADM

## 2019-09-20 RX ORDER — NEOSTIGMINE METHYLSULFATE 1 MG/ML
VIAL (ML) INJECTION PRN
Status: DISCONTINUED | OUTPATIENT
Start: 2019-09-20 | End: 2019-09-20

## 2019-09-20 RX ORDER — SODIUM CHLORIDE, SODIUM LACTATE, POTASSIUM CHLORIDE, CALCIUM CHLORIDE 600; 310; 30; 20 MG/100ML; MG/100ML; MG/100ML; MG/100ML
INJECTION, SOLUTION INTRAVENOUS CONTINUOUS
Status: DISCONTINUED | OUTPATIENT
Start: 2019-09-20 | End: 2019-09-20 | Stop reason: HOSPADM

## 2019-09-20 RX ORDER — PROPOFOL 10 MG/ML
INJECTION, EMULSION INTRAVENOUS PRN
Status: DISCONTINUED | OUTPATIENT
Start: 2019-09-20 | End: 2019-09-20

## 2019-09-20 RX ORDER — LIDOCAINE 40 MG/G
CREAM TOPICAL
Status: DISCONTINUED | OUTPATIENT
Start: 2019-09-20 | End: 2019-09-20 | Stop reason: HOSPADM

## 2019-09-20 RX ORDER — OXYCODONE HYDROCHLORIDE 5 MG/1
5-10 TABLET ORAL EVERY 4 HOURS PRN
Qty: 12 TABLET | Refills: 0 | Status: SHIPPED | OUTPATIENT
Start: 2019-09-20

## 2019-09-20 RX ORDER — AMOXICILLIN 250 MG
1-2 CAPSULE ORAL 2 TIMES DAILY
Qty: 30 TABLET | Refills: 0 | Status: SHIPPED | OUTPATIENT
Start: 2019-09-20

## 2019-09-20 RX ORDER — NALOXONE HYDROCHLORIDE 0.4 MG/ML
.1-.4 INJECTION, SOLUTION INTRAMUSCULAR; INTRAVENOUS; SUBCUTANEOUS
Status: DISCONTINUED | OUTPATIENT
Start: 2019-09-20 | End: 2019-09-20 | Stop reason: HOSPADM

## 2019-09-20 RX ORDER — ACETAMINOPHEN 325 MG/1
650 TABLET ORAL
Status: DISCONTINUED | OUTPATIENT
Start: 2019-09-20 | End: 2019-09-20 | Stop reason: HOSPADM

## 2019-09-20 RX ORDER — MEPERIDINE HYDROCHLORIDE 50 MG/ML
12.5 INJECTION INTRAMUSCULAR; INTRAVENOUS; SUBCUTANEOUS
Status: DISCONTINUED | OUTPATIENT
Start: 2019-09-20 | End: 2019-09-20 | Stop reason: HOSPADM

## 2019-09-20 RX ORDER — GLYCOPYRROLATE 0.2 MG/ML
INJECTION, SOLUTION INTRAMUSCULAR; INTRAVENOUS PRN
Status: DISCONTINUED | OUTPATIENT
Start: 2019-09-20 | End: 2019-09-20

## 2019-09-20 RX ORDER — OXYCODONE HYDROCHLORIDE 5 MG/1
5 TABLET ORAL ONCE
Status: COMPLETED | OUTPATIENT
Start: 2019-09-20 | End: 2019-09-20

## 2019-09-20 RX ORDER — ONDANSETRON 2 MG/ML
4 INJECTION INTRAMUSCULAR; INTRAVENOUS EVERY 30 MIN PRN
Status: DISCONTINUED | OUTPATIENT
Start: 2019-09-20 | End: 2019-09-20 | Stop reason: HOSPADM

## 2019-09-20 RX ORDER — ACETAMINOPHEN 325 MG/1
1000 TABLET ORAL EVERY 6 HOURS PRN
Qty: 100 TABLET | Refills: 0 | Status: SHIPPED | OUTPATIENT
Start: 2019-09-20

## 2019-09-20 RX ORDER — IBUPROFEN 200 MG
600 TABLET ORAL EVERY 6 HOURS PRN
Qty: 100 TABLET | Refills: 0 | Status: SHIPPED | OUTPATIENT
Start: 2019-09-20

## 2019-09-20 RX ORDER — CEFAZOLIN SODIUM 2 G/100ML
2 INJECTION, SOLUTION INTRAVENOUS
Status: COMPLETED | OUTPATIENT
Start: 2019-09-20 | End: 2019-09-20

## 2019-09-20 RX ORDER — PROPOFOL 10 MG/ML
INJECTION, EMULSION INTRAVENOUS CONTINUOUS PRN
Status: DISCONTINUED | OUTPATIENT
Start: 2019-09-20 | End: 2019-09-20

## 2019-09-20 RX ORDER — BUPIVACAINE HYDROCHLORIDE 5 MG/ML
INJECTION, SOLUTION EPIDURAL; INTRACAUDAL PRN
Status: DISCONTINUED | OUTPATIENT
Start: 2019-09-20 | End: 2019-09-20 | Stop reason: HOSPADM

## 2019-09-20 RX ORDER — FENTANYL CITRATE 50 UG/ML
25-50 INJECTION, SOLUTION INTRAMUSCULAR; INTRAVENOUS
Status: DISCONTINUED | OUTPATIENT
Start: 2019-09-20 | End: 2019-09-20 | Stop reason: HOSPADM

## 2019-09-20 RX ORDER — DEXAMETHASONE SODIUM PHOSPHATE 4 MG/ML
INJECTION, SOLUTION INTRA-ARTICULAR; INTRALESIONAL; INTRAMUSCULAR; INTRAVENOUS; SOFT TISSUE PRN
Status: DISCONTINUED | OUTPATIENT
Start: 2019-09-20 | End: 2019-09-20

## 2019-09-20 RX ORDER — ONDANSETRON 4 MG/1
4 TABLET, ORALLY DISINTEGRATING ORAL EVERY 30 MIN PRN
Status: DISCONTINUED | OUTPATIENT
Start: 2019-09-20 | End: 2019-09-20 | Stop reason: HOSPADM

## 2019-09-20 RX ORDER — EPHEDRINE SULFATE 50 MG/ML
INJECTION, SOLUTION INTRAMUSCULAR; INTRAVENOUS; SUBCUTANEOUS PRN
Status: DISCONTINUED | OUTPATIENT
Start: 2019-09-20 | End: 2019-09-20

## 2019-09-20 RX ORDER — FENTANYL CITRATE 50 UG/ML
INJECTION, SOLUTION INTRAMUSCULAR; INTRAVENOUS PRN
Status: DISCONTINUED | OUTPATIENT
Start: 2019-09-20 | End: 2019-09-20

## 2019-09-20 RX ORDER — ONDANSETRON 2 MG/ML
INJECTION INTRAMUSCULAR; INTRAVENOUS PRN
Status: DISCONTINUED | OUTPATIENT
Start: 2019-09-20 | End: 2019-09-20

## 2019-09-20 RX ORDER — HYDROMORPHONE HYDROCHLORIDE 1 MG/ML
.3-.5 INJECTION, SOLUTION INTRAMUSCULAR; INTRAVENOUS; SUBCUTANEOUS EVERY 10 MIN PRN
Status: DISCONTINUED | OUTPATIENT
Start: 2019-09-20 | End: 2019-09-20 | Stop reason: HOSPADM

## 2019-09-20 RX ADMIN — DEXAMETHASONE SODIUM PHOSPHATE 4 MG: 4 INJECTION, SOLUTION INTRA-ARTICULAR; INTRALESIONAL; INTRAMUSCULAR; INTRAVENOUS; SOFT TISSUE at 15:58

## 2019-09-20 RX ADMIN — ROCURONIUM BROMIDE 10 MG: 10 INJECTION INTRAVENOUS at 16:51

## 2019-09-20 RX ADMIN — Medication 5 MG: at 16:44

## 2019-09-20 RX ADMIN — ROCURONIUM BROMIDE 40 MG: 10 INJECTION INTRAVENOUS at 15:58

## 2019-09-20 RX ADMIN — HYDROMORPHONE HYDROCHLORIDE 0.5 MG: 1 INJECTION, SOLUTION INTRAMUSCULAR; INTRAVENOUS; SUBCUTANEOUS at 16:16

## 2019-09-20 RX ADMIN — MIDAZOLAM 2 MG: 1 INJECTION INTRAMUSCULAR; INTRAVENOUS at 15:52

## 2019-09-20 RX ADMIN — FENTANYL CITRATE 50 MCG: 50 INJECTION, SOLUTION INTRAMUSCULAR; INTRAVENOUS at 17:04

## 2019-09-20 RX ADMIN — FENTANYL CITRATE 50 MCG: 50 INJECTION INTRAMUSCULAR; INTRAVENOUS at 18:42

## 2019-09-20 RX ADMIN — OXYCODONE HYDROCHLORIDE 5 MG: 5 TABLET ORAL at 18:46

## 2019-09-20 RX ADMIN — CEFAZOLIN SODIUM 2 G: 2 INJECTION, SOLUTION INTRAVENOUS at 15:31

## 2019-09-20 RX ADMIN — PROPOFOL 200 MG: 10 INJECTION, EMULSION INTRAVENOUS at 15:58

## 2019-09-20 RX ADMIN — GLYCOPYRROLATE 0.2 MG: 0.2 INJECTION, SOLUTION INTRAMUSCULAR; INTRAVENOUS at 15:58

## 2019-09-20 RX ADMIN — ONDANSETRON HYDROCHLORIDE 4 MG: 2 INJECTION, SOLUTION INTRAVENOUS at 16:10

## 2019-09-20 RX ADMIN — FENTANYL CITRATE 100 MCG: 50 INJECTION, SOLUTION INTRAMUSCULAR; INTRAVENOUS at 15:58

## 2019-09-20 RX ADMIN — HYDROMORPHONE HYDROCHLORIDE 0.5 MG: 1 INJECTION, SOLUTION INTRAMUSCULAR; INTRAVENOUS; SUBCUTANEOUS at 18:30

## 2019-09-20 RX ADMIN — HYDROMORPHONE HYDROCHLORIDE 0.5 MG: 1 INJECTION, SOLUTION INTRAMUSCULAR; INTRAVENOUS; SUBCUTANEOUS at 16:36

## 2019-09-20 RX ADMIN — GLYCOPYRROLATE 0.7 MG: 0.2 INJECTION, SOLUTION INTRAMUSCULAR; INTRAVENOUS at 16:51

## 2019-09-20 RX ADMIN — FENTANYL CITRATE 50 MCG: 50 INJECTION INTRAMUSCULAR; INTRAVENOUS at 17:55

## 2019-09-20 RX ADMIN — SODIUM CHLORIDE, POTASSIUM CHLORIDE, SODIUM LACTATE AND CALCIUM CHLORIDE: 600; 310; 30; 20 INJECTION, SOLUTION INTRAVENOUS at 16:28

## 2019-09-20 RX ADMIN — ROCURONIUM BROMIDE 10 MG: 10 INJECTION INTRAVENOUS at 16:33

## 2019-09-20 RX ADMIN — SODIUM CHLORIDE, POTASSIUM CHLORIDE, SODIUM LACTATE AND CALCIUM CHLORIDE: 600; 310; 30; 20 INJECTION, SOLUTION INTRAVENOUS at 15:52

## 2019-09-20 RX ADMIN — FENTANYL CITRATE 50 MCG: 50 INJECTION INTRAMUSCULAR; INTRAVENOUS at 18:05

## 2019-09-20 RX ADMIN — PROPOFOL 55 MCG/KG/MIN: 10 INJECTION, EMULSION INTRAVENOUS at 16:10

## 2019-09-20 RX ADMIN — Medication 3.5 MG: at 16:56

## 2019-09-20 RX ADMIN — LIDOCAINE HYDROCHLORIDE 50 MG: 10 INJECTION, SOLUTION INFILTRATION; PERINEURAL at 15:58

## 2019-09-20 ASSESSMENT — ENCOUNTER SYMPTOMS: DYSRHYTHMIAS: 0

## 2019-09-20 ASSESSMENT — MIFFLIN-ST. JEOR
SCORE: 1823.53
SCORE: 1811.28

## 2019-09-20 NOTE — ANESTHESIA CARE TRANSFER NOTE
Patient: Daxa Wilkes    Procedure(s):  CHOLECYSTECTOMY, LAPAROSCOPIC    Diagnosis: unknown  Diagnosis Additional Information: No value filed.    Anesthesia Type:   General, ETT     Note:  Airway :Face Mask  Patient transferred to:PACU  Comments: To PACU, oxygen per face mask, report to RN.      Vitals: (Last set prior to Anesthesia Care Transfer)    CRNA VITALS  9/20/2019 1707 - 9/20/2019 1741      9/20/2019             Pulse:  114    SpO2:  100 %    Resp Rate (observed):  21                Electronically Signed By: FELICIA Goyal CRNA  September 20, 2019  5:41 PM

## 2019-09-20 NOTE — OP NOTE
General Surgery Operative Note    PREOPERATIVE DIAGNOSIS:  Acute cholecystitis    POSTOPERATIVE DIAGNOSIS:  Same    PROCEDURE:  Laparoscopic Cholecystectomy    ANESTHESIA:  General.    PREOPERATIVE MEDICATIONS:  Ancef IV.    SURGEON:  Stacia Santana MD    ASSISTANT:  Yossi Mi PA-C    ESTIMATED BLOOD LOSS:  25 ml    INDICATIONS:  Daxa Wilkes is a 33 year old female who has been experiencing episodes of epigastric and RUQ abdominal pain for the past 4 days associated with nausea, vomiting and anorexia.  Abdominal imaging has revealed cholelithiasis with gallbladder wall thickening.  She now presents for laparoscopic cholecystectomy after having risks and benefits reviewed in detail.    PROCEDURE:   A supraumbilical incision was made and the abdomen was entered using a Lora trocar technique.  Secondary trocars were placed under laparoscopic guidance.  Omentum was freed from the liver edge and the right abdominal wall in order to place secondary trocars and expose the gallbladder. We proceeded in the usual fashion first finding the gallbladder neck cystic duct junction. The cystic duct was then identified and cleared of inflammatory adhesions. The cystic artery was similarly identified.  The cystic duct was triply clipped and divided.  Cystic artery was also triply clipped and divided. The gallbladder was removed from the gallbladder bed using coag cautery.  Once the remaining attachments were divided, the gallbladder was extracted from the supraumbilical site in an EndoCatch bag.  The camera was repositioned and the right upper quadrant inspected.  The right upper quadrant was copiously irrigated with the saline solution.  Returns were clear.  Trocar sites were then infiltrated with 0.5% Marcaine plain. The infraumbilical fascial opening was closed with interrupted 0 Vicryl. The skin was closed using 4-0 subcuticular vicryl. Steri-Strips were placed on the incisions. The patient was transferred to  recovery in good condition.        INTRAOPERATIVE FINDINGS: Gallbladder inflammation consistent with acute cholecystitis, cholelithiasis    Specimens:   ID Type Source Tests Collected by Time Destination   A : Gallbladder and contents Tissue Gallbladder and Contents SURGICAL PATHOLOGY EXAM Stacia Santana MD 9/20/2019  5:13 PM        Stacia Santana MD

## 2019-09-20 NOTE — DISCHARGE INSTRUCTIONS
GENERAL ANESTHESIA OR SEDATION ADULT DISCHARGE INSTRUCTIONS   SPECIAL PRECAUTIONS FOR 24 HOURS AFTER SURGERY    IT IS NOT UNUSUAL TO FEEL LIGHT-HEADED OR FAINT, UP TO 24 HOURS AFTER SURGERY OR WHILE TAKING PAIN MEDICATION.  IF YOU HAVE THESE SYMPTOMS; SIT FOR A FEW MINUTES BEFORE STANDING AND HAVE SOMEONE ASSIST YOU WHEN YOU GET UP TO WALK OR USE THE BATHROOM.    YOU SHOULD REST AND RELAX FOR THE NEXT 24 HOURS AND YOU MUST MAKE ARRANGEMENTS TO HAVE SOMEONE STAY WITH YOU FOR AT LEAST 24 HOURS AFTER YOUR DISCHARGE.  AVOID HAZARDOUS AND STRENUOUS ACTIVITIES.  DO NOT MAKE IMPORTANT DECISIONS FOR 24 HOURS.    DO NOT DRIVE ANY VEHICLE OR OPERATE MECHANICAL EQUIPMENT FOR 24 HOURS FOLLOWING THE END OF YOUR SURGERY.  EVEN THOUGH YOU MAY FEEL NORMAL, YOUR REACTIONS MAY BE AFFECTED BY THE MEDICATION YOU HAVE RECEIVED.    DO NOT DRINK ALCOHOLIC BEVERAGES FOR 24 HOURS FOLLOWING YOUR SURGERY.    DRINK CLEAR LIQUIDS (APPLE JUICE, GINGER ALE, 7-UP, BROTH, ETC.).  PROGRESS TO YOUR REGULAR DIET AS YOU FEEL ABLE.    YOU MAY HAVE A DRY MOUTH, A SORE THROAT, MUSCLES ACHES OR TROUBLE SLEEPING.  THESE SHOULD GO AWAY AFTER 24 HOURS.    CALL YOUR DOCTOR FOR ANY OF THE FOLLOWING:  SIGNS OF INFECTION (FEVER, GROWING TENDERNESS AT THE SURGERY SITE, A LARGE AMOUNT OF DRAINAGE OR BLEEDING, SEVERE PAIN, FOUL-SMELLING DRAINAGE, REDNESS OR SWELLING.    IT HAS BEEN OVER 8 TO 10 HOURS SINCE SURGERY AND YOU ARE STILL NOT ABLE TO URINATE (PASS WATER).           HOME CARE FOLLOWING LAPAROSCOPIC CHOLECYSTECTOMY  Robby Diehl, JAKOB Hilton, EVON Duckworth. RONNIE Brantley &  BARBI Rosales      IINCISIONAL CARE:  Replace the bandage over your incisions until all drainage stops, or if more comfortable to have in place.  If present, leave the steri-strips (white paper tapes) in place for 14 days after surgery.  If Dermabond (a type of skin glue) is present, leave in place until it wears/flakes off.   BATHING:  Avoid baths for 1 week after surgery.  Showers are okay.   You may wash your hair at any time.  Gently pat your incisions dry after bathing.  ACTIVITY:  Light Activity -- you may immediately be up and about as tolerated.  Driving -- you may drive when comfortable and off narcotic pain medications.  Light Work -- resume when comfortable off pain medications.  (If you can drive, you probably can work.)  Strenuous Work/Activity -- limit lifting to 20 pounds for 2 weeks.  Progressively increase with time.  Active Sports (running, biking, etc.) -- cautiously resume after 2 weeks.  DISCOMFORT:  Use pain medications as prescribed by your surgeon.  Take the pain medication with some food, when possible, to minimize side effects.  Intermittent use of ice packs at the incision sites may help during the first 48 hours.  Expect gradual improvement.  You may experience shoulder pain, which is due to the air placed within your abdomen during the procedure.  This is temporary and usually passes within 2 days.  DIET:  Drink plenty of fluids.  While taking pain medications, increase dietary fiber or add a fiber supplementation like Metamucil or Citrucel to help prevent constipation - a possible side effect of pain medications.  If taking Metamucil or Citrucel, take with plenty of fluids as instructed.  It is not uncommon to experience some bowel changes (loose stools or constipation) after surgery.  Your body has to adapt to you no longer having a gall bladder.  To help minimize this side effect, avoid fatty foods for the first week after surgery.  You may then slowly increase the amount of fatty foods in your diet.    NAUSEA:  If nauseated from the anesthetic/pain meds; rest in bed, get up cautiously with assistance, and drink clear liquids (juice, tea, broth).  RETURN APPOINTMENT:  Schedule a follow-up visit 2-3 weeks post-op.  Office Phone:  [phone]    CONTACT US IF THE FOLLOWING DEVELOPS:  1.  A fever that is above 101   2.  If there is a large amount of drainage, bleeding, or  swelling.  3.  Severe pain that is not relieved by your prescription.  4.  Drainage that is thick, cloudy, yellow, green or white.  5.  Any other questions not answered by  Frequently Asked Questions  sheet.         FREQUENTLY ASKED QUESTIONS AFTER SURGERY  Paul Diehl, JAKOB Hilton, EVON Duckworth, RONNIE Brantley  & BARBI Rosales      Q:  How should my incision look?    A:  Normally your incision will appear slightly swollen with light redness directly along the incision itself as it heals.  It may feel like a bump or ridge as the healing/scarring happens, and over time (3-4 months) this bump or ridge feeling should slowly go away.  In general, clear or pink watery drainage can be normal at first as your incision heals, but should decrease over time.    Q:  How do I know if my incision is infected?  A:  Look at your incision for signs of infection, like redness around the incision spreading to surrounding skin, or drainage of cloudy or foul-smelling drainage.  If you feel warm, check your temperature to see if you are running a fever.    **If any of these things occur, please notify the nurse at our office.  We may need you to come into the office for an incision check.      Q:  How do I take care of my incision?  A:  If you have a dressing in place - Starting the day after surgery, replace the dressing 1-2 times a day until there is no further drainage from the incision.  At that time, a dressing is no longer needed.  Try to minimize tape on the skin if irritation is occurring at the tape sites.  If you have significant irritation from tape on the skin, please call the office to discuss other method of dressing your incision.    Small pieces of tape called  steri-strips  may be present directly overlying your incision; these may be removed 10 days after surgery unless otherwise specified by your surgeon.  If these tapes start to loosen at the ends, you may trim them back until they fall off or are removed.    A:  If you  had  Dermabond  tissue glue used as a dressing (this causes your incision to look shiny with a clear covering over it) - This type of dressing wears off with time and does not require more dressings over the top unless it is draining around the glue as it wears off.  Do not apply ointments or lotions over the incisions until the glue has completely worn off.    Q:  There is a piece of tape or a sticky  lead  still on my skin.  Can I remove this?  A:  Sometimes the sticky  leads  used for monitoring during surgery or for evaluation in the emergency department are not all removed while you are in the hospital.  These sometimes have a tab or metal dot on them.  You can easily remove these on your own, like taking off a band-aid.  If there is a gel substance under the  lead , simply wipe/clean it off with a washcloth or paper towel.      Q:  What can I do to minimize constipation (very hard stools, or lack of stools)?  A:  Stay well hydrated.  Increase your dietary fiber intake or take a fiber supplement -with plenty of water.  Walk around frequently.  You may consider an over-the-counter stool-softener.  Your Pharmacist can assist you with choosing one that is stocked at your pharmacy.  Constipation is also one of the most common side effects of pain medication.  If you are using pain medication, be pro-active and try to PREVENT problems with constipation by taking the steps above BEFORE constipation becomes a problem.    Q:  What do I do if I need more pain medications?  A:  Call the office to receive refills.  Be aware that certain pain meds cannot be called into a pharmacy and actually require a paper prescription.  A change may be made in your pain med as you progress thru your recovery period or if you have side effects to certain meds.    --Pain meds are NOT refilled after 5pm on weekdays, and NOT AT ALL on the weekends, so please look ahead to prevent problems.    Q:  Why am I having a hard time sleeping now  that I am at home?  A:  Many medications you receive while you are in the hospital can impact your sleep for a number of days after your surgery/hospitalization.  Decreased level of activity and naps during the day may also make sleeping at night difficult.  Try to minimize day-time naps, and get up frequently during the day to walk around your home during your recovery time.  Sleep aides may be of some help, but are not recommended for long-term use.      Q:  I am having some back discomfort.  What should I do?  A:  This may be related to certain positioning that was required for your surgery, extended periods of time in bed, or other changes in your overall activity level.  You may try ice, heat, acetaminophen, or ibuprofen to treat this temporarily.  Note that many pain medications have acetaminophen in them and would state this on the prescription bottle.  Be sure not to exceed the maximum of 4000mg per day of acetaminophen.     **If the pain you are having does not resolve, is severe, or is a flare of back pain you have had on other occasions prior to surgery, please contact your primary physician for further recommendations or for an appointment to be examined at their office.    Q:  Why am I having headaches?  A:  Headaches can be caused by many things:  caffeine withdrawal, use of pain meds, dehydration, high blood pressure, lack of sleep, over-activity/exhaustion, flare-up of usual migraine headaches.  If you feel this is related to muscle tension (a band-like feeling around the head, or a pressure at the low-back of the head) you may try ice or heat to this area.  You may need to drink more fluids (try electrolyte drink like Gatorade), rest, or take your usual migraine medications.   **If your headaches do not resolve, worsen, are accompanied by other symptoms, or if your blood pressure is high, please call your primary physician for recommendation and/or examination.    Q:  I am unable to urinate.  What  do I do?  A:  A small percentage of people can have difficulty urinating initially after surgery.  This includes being able to urinate only a very small amount at a time and feeling discomfort or pressure in the very low abdomen.  This is called  urinary retention , and is actually an urgent situation.  Proceed to your nearest Emergency department for evaluation (not an Urgent Care Center).  Sometimes the bladder does not work correctly after certain medications you receive during surgery, or related to certain procedures.  You may need to have a catheter placed until your bladder recovers.  When planning to go to an Emergency department, it may help to call the ER to let them know you are coming in for this problem after a surgery.  This may help you get in quicker to be evaluated.  **If you have symptoms of a urinary tract infection, please contact your primary physician for the proper evaluation and treatment.    If you have other questions, please call the office Monday thru Friday between 8am and 5pm to discuss with the nurse or physician assistant.  #[phone]    There is a surgeon ON CALL on weekday evenings and over the weekend in case of urgent need only, and may be contacted at the same number.    If you are having an emergency, call 911 or proceed to your nearest emergency department.

## 2019-09-20 NOTE — RESULT ENCOUNTER NOTE
Final urine culture report is NEGATIVE per [facility] Lab Result protocol.    If NEGATIVE result, no change in treatment, per [facility] ED Lab Result protocol.

## 2019-09-20 NOTE — LETTER
2019    RE: Daxa Wilkes, : 1985      [facility]  New Patient Office Visit        Daxa Wilkes is a 33 year old female seen in consultation for Abdominal pain, right upper quadrant at the request of Jennifer West PA-C.       Assessment and Plan:  It is my impression that Daxa has acute cholecystitis.   I have offered her a laparoscopic cholecystectomy.       We have discussed the indication, alternatives, risks and expected recovery.  Specifically we have discussed incisions, scarring, postoperative infections, anesthesia, bleeding, blood transfusion, open conversion, common bile duct injury, injury to intra-abdominal organs, adhesions that can lead to bowel obstruction, retained common bile duct stone, bile leak, DVT, PE, hernia, post cholecystectomy diarrhea, postoperative dietary restrictions and physical limitations.  We have discussed the recommended interventions and treatments for these complications.  All questions have been answered to the best of my ability.           We will schedule surgery today.  She does not need a preop H&P to be performed by PCP.     Chief complaint:  Abdominal pain, epigastric  Abdominal pain, right upper quadrant     HPI:  Daxa Wilkes is a 33 year old female who presents with constant epigastric and right upper quadrant pain for 2 days.  The pain is not associated with eating any type of food.  Positive for associated symptoms of anorexia, nausea and vomiting.  Negative for associated symptoms of diarrhea and constipation.  She does not have a history of jaundice or dark urine.  She  has not had pancreatitis in the past.         The patient does report intermittent right upper quadrant pain with nausea, vomiting, and diarrhea that has been occurring for about 2 years.  She notices that it is worse when she eats red meat and has been avoiding red meat as a result.  She has never experienced an episode as long-lasting or with a sharp of  "pain as this one.      She pesented to the ED yesterday morning with the symptoms and work-up showed normal labs but CT and ultrasound with lithiasis and gallbladder wall thickening.  Her symptoms were managed with pain medication she was instructed to follow-up in general surgery clinic.  Since her ED visit, her pain is continued.       Past Medical History:   has a past medical history of Depressive disorder, GERD (gastroesophageal reflux disease), HELLP syndrome, delivered, current hospitalization (7/13/2016), PUD (peptic ulcer disease), Thyroid disease, and Vaginal delivery (7/11/2016).      No FH bleeding or clotting problems or reactions to anesthesia     Review of Systems:  The 10 point review of systems is negative other than noted in the HPI and above.     Physical Exam:  Vitals: /60   Pulse 77   Resp 16   Ht 1.676 m (5' 6\")   Wt 110.7 kg (244 lb)   SpO2 99%   BMI 39.38 kg/m    BMI= Body mass index is 39.38 kg/m .  General - Well developed, well nourished male in no apparent distress  HEENT:  Pupils equal and round, conjunctivae clear, no scleral icterus, mucous membranes moist, external ears and nose normal  Pulmonary: Breathing is unlabored on room air  CV: Regular pulse  Abdomen: soft, obese, non-distended with moderate tenderness noted in the epigastric region and in the right upper quadrant . no masses palpated.  Musculoskeletal:  Moves all extremities equally, arm without edema  Neurologic: alert, speech is clear, nonfocal  Psychiatric: Mood and affect appropriate  Skin: Without lesions, rashes or jaundice     Relevant labs:     WBC -         Lab Results   Component Value Date     WBC 4.9 09/19/2019         HgB -         Lab Results   Component Value Date     HGB 13.8 09/19/2019         Plt-         Lab Results   Component Value Date      09/19/2019         Liver Function Studies -       Recent Labs   Lab Test 09/19/19  0530   PROTTOTAL 7.4   ALBUMIN 3.9   BILITOTAL 0.4   ALKPHOS 85 "   AST 25   ALT 37         Lipase-         Lab Results   Component Value Date     LIPASE 102 09/19/2019            Imaging:  All imaging studies reviewed by me.     Ultrasound shows: positive cholelithiasis, positive gallbladder wall thickening, negative ductal dilatation, positive pericholecystic fluid, positive sonographic Troy's sign.         Recent Results (from the past 744 hour(s))   CT Abdomen Pelvis w Contrast     Narrative     EXAM: CT ABDOMEN PELVIS W CONTRAST  LOCATION: [facility]  DATE/TIME: 9/19/2019 6:20 AM     INDICATION: Abd pain, acute, generalized.  COMPARISON: None.  TECHNIQUE: Helical enhanced thin-section CT scan of the abdomen and pelvis was performed following injection of IV contrast. Multiplanar reformats were obtained. Dose reduction techniques were used.  CONTRAST: 100mL Isovue-370.     FINDINGS:   LUNG BASES: Negative.     ABDOMEN: There are stones in the gallbladder. Gallbladder wall appears mildly thickened. The spleen is enlarged measuring approximately 15 cm AP. The liver, pancreas, adrenal glands and kidneys are normal in appearance. There is no abdominal or pelvic   lymph node enlargement.     PELVIS: There is an IUD in place. No adnexal mass. There is no bowel obstruction or inflammation. The appendix is within normal limits. No free intraperitoneal gas or fluid.        MUSCULOSKELETAL: Negative.        Impression     CONCLUSION:   1.  Cholelithiasis. The gallbladder wall appears mildly thickened. Consider ultrasound or hepatobiliary scan in further evaluation.  2.  Mild splenomegaly.  3.  No bowel obstruction or inflammation.            Abdomen US, limited (RUQ only)     Narrative     ULTRASOUND ABDOMEN LIMITED   9/19/2019 7:49 AM      HISTORY:  Evaluate for cholecystitis.     COMPARISON: None available     FINDINGS:    Gallbladder: Multiple gallstones are present. Gallbladder wall is  thickened measuring 5 mm. Positive sonographic Troy sign.     Bile ducts:  CHD  measures 6 mm.  No intrahepatic biliary dilatation.     Liver: Diffusely increased echogenicity compatible with fatty  infiltration.     Pancreas:  Partially obscured by overlying bowel gas,  but grossly  unremarkable.      Right kidney:  Normal.      Aorta and IVC:  Not specifically assessed.        Impression     IMPRESSION:  Findings concerning for acute cholecystitis with  gallbladder wall thickening, positive sonographic Troy sign and  multiple gallstones.     MD Stacia NEUMANN MD  [facility], [place]

## 2019-09-20 NOTE — PROGRESS NOTES
[facility]  New Patient Office Visit      Daxa Wilkes is a 33 year old female seen in consultation for Abdominal pain, right upper quadrant at the request of Jennifer West PA-C.       Assessment and Plan:  It is my impression that Daxa has acute cholecystitis.   I have offered her a laparoscopic cholecystectomy.      We have discussed the indication, alternatives, risks and expected recovery.  Specifically we have discussed incisions, scarring, postoperative infections, anesthesia, bleeding, blood transfusion, open conversion, common bile duct injury, injury to intra-abdominal organs, adhesions that can lead to bowel obstruction, retained common bile duct stone, bile leak, DVT, PE, hernia, post cholecystectomy diarrhea, postoperative dietary restrictions and physical limitations.  We have discussed the recommended interventions and treatments for these complications.  All questions have been answered to the best of my ability.           We will schedule surgery today.  She does not need a preop H&P to be performed by PCP.    Chief complaint:  Abdominal pain, epigastric  Abdominal pain, right upper quadrant    HPI:  Daxa Wilkes is a 33 year old female who presents with constant epigastric and right upper quadrant pain for 2 days.  The pain is not associated with eating any type of food.  Positive for associated symptoms of anorexia, nausea and vomiting.  Negative for associated symptoms of diarrhea and constipation.  She does not have a history of jaundice or dark urine.  She  has not had pancreatitis in the past.        The patient does report intermittent right upper quadrant pain with nausea, vomiting, and diarrhea that has been occurring for about 2 years.  She notices that it is worse when she eats red meat and has been avoiding red meat as a result.  She has never experienced an episode as long-lasting or with a sharp of pain as this one.     She pesented to the ED yesterday morning with the  "symptoms and work-up showed normal labs but CT and ultrasound with lithiasis and gallbladder wall thickening.  Her symptoms were managed with pain medication she was instructed to follow-up in general surgery clinic.  Since her ED visit, her pain is continued.      Past Medical History:   has a past medical history of Depressive disorder, GERD (gastroesophageal reflux disease), HELLP syndrome, delivered, current hospitalization (7/13/2016), PUD (peptic ulcer disease), Thyroid disease, and Vaginal delivery (7/11/2016).    Past Surgical History:  Past Surgical History:   Procedure Laterality Date     IRRIGATION AND DEBRIDEMENT TRUNK, COMBINED Right 7/29/2019    Procedure: Excisional debridement, right posterior shoulder abscess;  Surgeon: Meeta Hilton MD;  Location: [facility] OR       Social History:  Social History     Tobacco Use     Smoking status: Never Smoker     Smokeless tobacco: Never Used   Substance Use Topics     Alcohol use: No     Drug use: No        Family History:  Family History   Problem Relation Age of Onset     Unknown/Adopted Mother      Thyroid Disease Father      Breast Cancer Maternal Grandmother      Thyroid Disease Maternal Grandfather      Thyroid Disease Paternal Grandmother      Thyroid Disease Paternal Grandfather      Thyroid Disease Brother      Thyroid Disease Sister      No FH bleeding or clotting problems or reactions to anesthesia    Review of Systems:  The 10 point review of systems is negative other than noted in the HPI and above.    Physical Exam:  Vitals: /60   Pulse 77   Resp 16   Ht 1.676 m (5' 6\")   Wt 110.7 kg (244 lb)   SpO2 99%   BMI 39.38 kg/m    BMI= Body mass index is 39.38 kg/m .  General - Well developed, well nourished male in no apparent distress  HEENT:  Pupils equal and round, conjunctivae clear, no scleral icterus, mucous membranes moist, external ears and nose normal  Pulmonary: Breathing is unlabored on room air  CV: Regular pulse  Abdomen: soft, " obese, non-distended with moderate tenderness noted in the epigastric region and in the right upper quadrant . no masses palpated.  Musculoskeletal:  Moves all extremities equally, arm without edema  Neurologic: alert, speech is clear, nonfocal  Psychiatric: Mood and affect appropriate  Skin: Without lesions, rashes or jaundice    Relevant labs:    WBC -   Lab Results   Component Value Date    WBC 4.9 09/19/2019       HgB -   Lab Results   Component Value Date    HGB 13.8 09/19/2019       Plt-   Lab Results   Component Value Date     09/19/2019       Liver Function Studies -   Recent Labs   Lab Test 09/19/19  0530   PROTTOTAL 7.4   ALBUMIN 3.9   BILITOTAL 0.4   ALKPHOS 85   AST 25   ALT 37       Lipase-   Lab Results   Component Value Date    LIPASE 102 09/19/2019         Imaging:  All imaging studies reviewed by me.    Ultrasound shows: positive cholelithiasis, positive gallbladder wall thickening, negative ductal dilatation, positive pericholecystic fluid, positive sonographic Troy's sign.    Recent Results (from the past 744 hour(s))   CT Abdomen Pelvis w Contrast    Narrative    EXAM: CT ABDOMEN PELVIS W CONTRAST  LOCATION: [facility]  DATE/TIME: 9/19/2019 6:20 AM    INDICATION: Abd pain, acute, generalized.  COMPARISON: None.  TECHNIQUE: Helical enhanced thin-section CT scan of the abdomen and pelvis was performed following injection of IV contrast. Multiplanar reformats were obtained. Dose reduction techniques were used.  CONTRAST: 100mL Isovue-370.    FINDINGS:   LUNG BASES: Negative.    ABDOMEN: There are stones in the gallbladder. Gallbladder wall appears mildly thickened. The spleen is enlarged measuring approximately 15 cm AP. The liver, pancreas, adrenal glands and kidneys are normal in appearance. There is no abdominal or pelvic   lymph node enlargement.    PELVIS: There is an IUD in place. No adnexal mass. There is no bowel obstruction or inflammation. The appendix is within  normal limits. No free intraperitoneal gas or fluid.      MUSCULOSKELETAL: Negative.      Impression    CONCLUSION:   1.  Cholelithiasis. The gallbladder wall appears mildly thickened. Consider ultrasound or hepatobiliary scan in further evaluation.  2.  Mild splenomegaly.  3.  No bowel obstruction or inflammation.         Abdomen US, limited (RUQ only)    Narrative    ULTRASOUND ABDOMEN LIMITED   9/19/2019 7:49 AM     HISTORY:  Evaluate for cholecystitis.    COMPARISON: None available    FINDINGS:    Gallbladder: Multiple gallstones are present. Gallbladder wall is  thickened measuring 5 mm. Positive sonographic Tory sign.    Bile ducts:  CHD measures 6 mm.  No intrahepatic biliary dilatation.    Liver: Diffusely increased echogenicity compatible with fatty  infiltration.    Pancreas:  Partially obscured by overlying bowel gas,  but grossly  unremarkable.     Right kidney:  Normal.     Aorta and IVC:  Not specifically assessed.      Impression    IMPRESSION:  Findings concerning for acute cholecystitis with  gallbladder wall thickening, positive sonographic Troy sign and  multiple gallstones.    CINDY MELCHOR MD         This note was created using voice recognition software. Undetected word substitutions or other errors may have occurred.     Time spent with the patient with greater that 50% of the time in discussion was 20 minutes.     Stacia Santana MD  [facility], [place]    Please route or send letter to:  Primary Care Provider (PCP) and Referring Provider

## 2019-09-20 NOTE — TELEPHONE ENCOUNTER
Type of surgery: LAPAROSCOPIC CHOLECYSTECTOMY   Location of surgery: [facility]  Date and time of surgery: 9/20/2019  Surgeon: DR LOPEZ   Pre-Op Appt Date: DONE ER 9/19/2019   Post-Op Appt Date: PATIENT TO SCHEDULE     Packet sent out: Yes PACKET AND SOAP GIVEN TO PATIENT    Pre-cert/Authorization completed:  Not Applicable  Date: 9/20/25019    LAPAROSCOPIC CHOLECYSTECTOMY    GENERAL H&P DNE ER 9/19/2019  90 MIN REQ PA ASSIST MGB NMS

## 2019-09-20 NOTE — ANESTHESIA PREPROCEDURE EVALUATION
Anesthesia Pre-Procedure Evaluation    Patient: Daxa Wilkes   MRN: [id number] : 1985          Preoperative Diagnosis: unknown    Procedure(s):  CHOLECYSTECTOMY, LAPAROSCOPIC    Past Medical History:   Diagnosis Date     Depressive disorder      GERD (gastroesophageal reflux disease)      HELLP syndrome, delivered, current hospitalization 2016     PUD (peptic ulcer disease)      Thyroid disease     hypo     Vaginal delivery 2016     Past Surgical History:   Procedure Laterality Date     IRRIGATION AND DEBRIDEMENT TRUNK, COMBINED Right 2019    Procedure: Excisional debridement, right posterior shoulder abscess;  Surgeon: Meeta Hilton MD;  Location: [facility] OR     Anesthesia Evaluation     .             ROS/MED HX    ENT/Pulmonary:      (-) asthma and sleep apnea   Neurologic:      (-) TIA, Other neuro hx and Dementia   Cardiovascular:     (+) hypertension----. : . . . :. .      (-) CAD, CHF, arrhythmias and pulmonary hypertension   METS/Exercise Tolerance:     Hematologic:        (-) anemia   Musculoskeletal:        (-) arthritis   GI/Hepatic:     (+) GERD cholecystitis/cholelithiasis,       Renal/Genitourinary:      (-) renal disease   Endo:     (+) thyroid problem hypothyroidism, Obesity, .   (-) Type I DM, Type II DM, chronic steroid usage and other endocrine disorder   Psychiatric:     (+) psychiatric history depression      Infectious Disease:         Malignancy:         Other:                          Physical Exam      Airway   Mallampati: II  TM distance: >3 FB  Neck ROM: full    Dental     Cardiovascular   Rhythm and rate: regular and normal  (-) no murmur    Pulmonary    breath sounds clear to auscultation    Other findings: Lab Test        19                       0530          1144          0646          0715          WBC          4.9          7.0           --          8.8           HGB          13.8         14.5          --           "12.2          MCV          95           95            --          88            PLT          167          169          125*         134*           Lab Test        09/19/19 07/28/19                       0530          1144          NA           139          136           POTASSIUM    3.7          3.7           CHLORIDE     107          107           CO2          27           24            BUN          12           9             CR           0.72         0.64          ANIONGAP     5            5             SAMIA          8.8          8.8           GLC          113*         134*                Lab Results   Component Value Date    WBC 4.9 09/19/2019    HGB 13.8 09/19/2019    HCT 42.8 09/19/2019     09/19/2019    CRP 83.0 (H) 07/28/2019     09/19/2019    POTASSIUM 3.7 09/19/2019    CHLORIDE 107 09/19/2019    CO2 27 09/19/2019    BUN 12 09/19/2019    CR 0.72 09/19/2019     (H) 09/19/2019    SAMIA 8.8 09/19/2019    ALBUMIN 3.9 09/19/2019    PROTTOTAL 7.4 09/19/2019    ALT 37 09/19/2019    AST 25 09/19/2019    ALKPHOS 85 09/19/2019    BILITOTAL 0.4 09/19/2019    LIPASE 102 09/19/2019    TSH 2.56 02/22/2010    HCG Negative 09/19/2019       Preop Vitals  BP Readings from Last 3 Encounters:   09/20/19 102/60   09/19/19 114/77   09/11/19 104/68    Pulse Readings from Last 3 Encounters:   09/20/19 77   09/19/19 93   09/11/19 104      Resp Readings from Last 3 Encounters:   09/20/19 16   09/19/19 18   09/11/19 16    SpO2 Readings from Last 3 Encounters:   09/20/19 99%   09/19/19 98%   09/11/19 97%      Temp Readings from Last 1 Encounters:   09/19/19 98.8  F (37.1  C) (Temporal)    Ht Readings from Last 1 Encounters:   09/20/19 1.676 m (5' 6\")      Wt Readings from Last 1 Encounters:   09/20/19 110.7 kg (244 lb)    Estimated body mass index is 39.38 kg/m  as calculated from the following:    Height as of an earlier encounter on 9/20/19: 1.676 m (5' 6\").    Weight as of an earlier encounter on 9/20/19: 110.7 " kg (244 lb).       Anesthesia Plan      History & Physical Review  History and physical reviewed and following examination; no interval change.    ASA Status:  3 .    NPO Status:  > 8 hours    Plan for General and ETT with Propofol induction. Maintenance will be Balanced.    PONV prophylaxis:  Ondansetron (or other 5HT-3)       Postoperative Care  Postoperative pain management:  IV analgesics and Oral pain medications.      Consents  Anesthetic plan, risks, benefits and alternatives discussed with:  Patient..                 Terry Jurado MD                    .

## 2019-09-24 LAB — COPATH REPORT: NORMAL

## 2019-12-12 ENCOUNTER — HOSPITAL ENCOUNTER (EMERGENCY)
Facility: CLINIC | Age: 34
Discharge: HOME | End: 2019-12-12
Attending: PHYSICIAN ASSISTANT | Admitting: PHYSICIAN ASSISTANT
Payer: COMMERCIAL

## 2019-12-12 VITALS
BODY MASS INDEX: 39.14 KG/M2 | WEIGHT: 242.51 LBS | RESPIRATION RATE: 18 BRPM | SYSTOLIC BLOOD PRESSURE: 121 MMHG | TEMPERATURE: 98.2 F | DIASTOLIC BLOOD PRESSURE: 76 MMHG | OXYGEN SATURATION: 100 % | HEART RATE: 78 BPM

## 2019-12-12 DIAGNOSIS — Z86.14 HISTORY OF MRSA INFECTION: ICD-10-CM

## 2019-12-12 DIAGNOSIS — L03.90 CELLULITIS: ICD-10-CM

## 2019-12-12 PROCEDURE — 99282 EMERGENCY DEPT VISIT SF MDM: CPT

## 2019-12-12 RX ORDER — SULFAMETHOXAZOLE/TRIMETHOPRIM 800-160 MG
1 TABLET ORAL 2 TIMES DAILY
Qty: 10 TABLET | Refills: 0 | Status: SHIPPED | OUTPATIENT
Start: 2019-12-12 | End: 2019-12-17

## 2019-12-12 RX ORDER — CEPHALEXIN 500 MG/1
500 CAPSULE ORAL 4 TIMES DAILY
Qty: 20 CAPSULE | Refills: 0 | Status: SHIPPED | OUTPATIENT
Start: 2019-12-12 | End: 2019-12-17

## 2019-12-12 ASSESSMENT — ENCOUNTER SYMPTOMS
WOUND: 1
SHORTNESS OF BREATH: 0
CHILLS: 0
FEVER: 0

## 2019-12-12 NOTE — ED AVS SNAPSHOT
[facility] Emergency Department  [address]  [facility] [address]  Phone:  [phone]  Fax:  [phone]                                    Daxa Wilkes   MRN: [id number]    Department:  [facility] Emergency Department   Date of Visit:  12/12/2019           After Visit Summary Signature Page    I have received my discharge instructions, and my questions have been answered. I have discussed any challenges I see with this plan with the nurse or doctor.    ..........................................................................................................................................  Patient/Patient Representative Signature      ..........................................................................................................................................  Patient Representative Print Name and Relationship to Patient    ..................................................               ................................................  Date                                   Time    ..........................................................................................................................................  Reviewed by Signature/Title    ...................................................              ..............................................  Date                                               Time          22EPIC Rev 08/18

## 2019-12-13 NOTE — DISCHARGE INSTRUCTIONS
See primary if not improving after the antibiotics have been initiated.  Return if redness spreading beyond the lines drawn today in 24 to 48 hours, fevers develop, new concerns.  Tylenol new Profen at home for pain control.  Begin both of the antibiotics.      Discharge Instructions  Cellulitis    Cellulitis is an infection of the skin that occurs when bacteria enter the skin.   Symptoms are generally redness, swelling, warmth and pain.  Your infection appeared to be appropriate to treat at home with antibiotics.  However, sometimes your infection may be worse than it seemed at first, or may worsen with time. If you have new or worse symptoms, you may need to be seen again in the Emergency Department or by your primary doctor.    Return to the Emergency Department if:  The redness, pain, or swelling gets a lot worse.  If the red area was marked, return if it is red beyond the marked area.  You are unable to get your antibiotics, or are vomiting them up or you can t take them.  You are feeling more ill, weak or lightheaded.  You start to run a new fever (temperature >101).  Anything else about the infection worries or concerns you.  Treatment:  Start your antibiotics right away, and take them as prescribed. Be sure to finish the whole prescription, even if you are better.  Apply a heating pad, warm packs, or warm water soaks to the infected area for 15 minutes at a time, at least 3 times a day. Do not use a heating pad on your feet or legs if you have diabetes. Do not sleep with a heating pad on, since this can cause burns or skin injury.  Rest your injured area for at least 1-2 days. After that you may start using your extremity again as long as there is not too much pain.   Raise the injured area above the level of your heart as much as possible in the first 1-2 days.  Tylenol  (acetaminophen), Motrin  (ibuprofen), or Advil  (ibuprofen) may help may help reduce pain and fever and may help you feel more  "comfortable. Be sure to read and follow the package directions, and ask your doctor if you have questions.    Follow-up with your doctor:  Re-check in clinic within 2-3 days.  Probiotics: If you have been given an antibiotic, you may want to also take a probiotic pill or eat yogurt with live cultures. Probiotics have \"good bacteria\" to help your intestines stay healthy. Studies have shown that probiotics help prevent diarrhea and other intestine problems (including C. diff infection) when you take antibiotics. You can buy these without a prescription in the pharmacy section of the store.     If you were given a prescription for medicine here today, be sure to read all of the information (including the package insert) that comes with your prescription.  This will include important information about the medicine, its side effects, and any warnings that you need to know about.  The pharmacist who fills the prescription can provide more information and answer questions you may have about the medicine.  If you have questions or concerns that the pharmacist cannot address, please call or return to the Emergency Department.     Opioid Medication Information    Pain medications are among the most commonly prescribed medicines, so we are including this information for all our patients. If you did not receive pain medication or get a prescription for pain medicine, you can ignore it.     You may have been given a prescription for an opioid (narcotic) pain medicine and/or have received a pain medicine while here in the Emergency Department. These medicines can make you drowsy or impaired. You must not drive, operate dangerous equipment, or engage in any other dangerous activities while taking these medications. If you drive while taking these medications, you could be arrested for DUI, or driving under the influence. Do not drink any alcohol while you are taking these medications.     Opioid pain medications can cause " addiction. If you have a history of chemical dependency of any type, you are at a higher risk of becoming addicted to pain medications.  Only take these prescribed medications to treat your pain when all other options have been tried. Take it for as short a time and as few doses as possible. Store your pain pills in a secure place, as they are frequently stolen and provide a dangerous opportunity for children or visitors in your house to start abusing these powerful medications. We will not replace any lost or stolen medicine.  As soon as your pain is better, you should flush all your remaining medication.     Many prescription pain medications contain Tylenol  (acetaminophen), including Vicodin , Tylenol #3 , Norco , Lortab , and Percocet .  You should not take any extra pills of Tylenol  if you are using these prescription medications or you can get very sick.  Do not ever take more than 3000 mg of acetaminophen in any 24 hour period.    All opioids tend to cause constipation. Drink plenty of water and eat foods that have a lot of fiber, such as fruits, vegetables, prune juice, apple juice and high fiber cereal.  Take a laxative if you don t move your bowels at least every other day. Miralax , Milk of Magnesia, Colace , or Senna  can be used to keep you regular.      Remember that you can always come back to the Emergency Department if you are not able to see your regular doctor in the amount of time listed above, if you get any new symptoms, or if there is anything that worries you.

## 2019-12-13 NOTE — ED TRIAGE NOTES
Wound inner right ankle. Started noticing spreading redness, warmth and swelling yesterday. Puss like drainage coming from wound. No fever noted

## 2019-12-13 NOTE — ED PROVIDER NOTES
History     Chief Complaint:  Wound Check    HPI   Daxa Wilkes is a 34 year old female with a history of MRSA who presents with wound check. The patient reports she noticed a wound on her right lower extremity last week, but does not know how she injured it. She notes the last few days, it has become hot and painful with purulent drainage. She denies fevers, chills, and shortness of breath. No calf pain or swelling.     Allergies:  Penicillin G    Medications:    Synthroid  Zoloft  Albuterol inhaler  Effexor XR  Wellbutrin  Zantac    Past Medical History:    Depressive disorder  Gastro-oesophageal reflux disease  HELLP syndrome  Peptic ulcer disease  Thyroid disease  Irritable bowel syndrome    Past Surgical History:    Irrigation and debridement trunk (R)  Cholecystectomy  [place] Teeth Extraction  Cyst removal   Knee surgery  Fallopian tube stent placement    Family History:    Thyroid disease (Father, Brother, Sister)    Social History:  Smoking status: Never  Alcohol use: No  Drug use: No  PCP: Jennifer West  Marital Status:   [2]     Review of Systems   Constitutional: Negative for chills and fever.   Respiratory: Negative for shortness of breath.    Skin: Positive for wound.   All other systems reviewed and are negative.    Physical Exam     Patient Vitals for the past 24 hrs:   BP Temp Temp src Pulse Resp SpO2 Weight   12/12/19 1956 127/83 -- -- -- -- -- 110 kg (242 lb 8.1 oz)   12/12/19 1955 -- 98.2  F (36.8  C) Oral 82 16 100 % --     Physical Exam  General: Well appearing, well nourished. Normal mood and affect.  Skin: Erythema and warmth overlying region of the right lower shin, medial aspect.  Central abrasion to the erythema, draining purulent matter.  No crepitus or fluctuance throughout this region.  HEENT: Head: Normocephalic, atraumatic, no visible masses.   Eyes: Conjunctiva clear.  Throat/pharynx: Mucous membranes moist, no mucosal lesions.   Cardiac: Normal rate and regular rhythm, no  murmur or gallop.   Lungs: Clear to auscultation.  Abdomen: Abdomen soft, non-tender. No rebound tenderness of guarding.   Musculoskeletal: Normal gait and station. No calf tenderness or swelling.   Right foot: Dorsalis pedis and posterior tibial arteries intact. Normal sensation to the foot. Flexors and extensors to the toes are normal. Dorsiflexion and plantarflexion to the ankle/foot is normal.   Neurologic: Oriented x 3. GCS: 15.  Psychiatric: Intact recent and remote memory, judgment and insight, normal mood and affect.     Emergency Department Course   Imaging:  None     Laboratory:  None     Procedures:  None     Interventions:  None     Emergency Department Course:  Past medical records, nursing notes, and vitals reviewed.  I performed an exam of the patient and obtained history, as documented above.    We discussed outpatient management.  Antibiotics provided.  Patient in agreement with the treatment plan.    Impression & Plan    Medical Decision Making:  Daxa Wilkes is a 34 year old female who presents for evaluation of erythema and drainage from wound on right lower leg.  Details of the patient's history can be noted in the HPI.  Upon my exam, skin overlying right lower shin appeared erythematous and was mildly warm to the touch.  History and exam appear consistent with uncomplicated cellulitis.  There was purulent drainage from the center of the wound without obvious region of fluctuance to suggest abscess.  There are no signs of abscess formation, necrotizing fasciitis, osteomyelitis, sepsis, shock at this time.  Patient otherwise appears well and is vitally stable.  As the patient has had significant MRSA infection in the past, will cover her with Keflex and Bactrim.  They will follow-up with her primary care provider within the next 2-3 days for recheck.  Area around the cellulitis was outlined with skin pen.  Return to the ED for further evaluation for worsening redness, increasing pain, fevers  new concerns.  Elevation, pain control, other symptomatic cares discussed.  All questions were answered prior to discharge.  Patient was in agreement with the treatment plan as stated above.    Diagnosis:    ICD-10-CM    1. Cellulitis L03.90    2. History of MRSA infection Z86.14        Disposition:  discharged to home    Discharge Medications:  Discharge Medication List as of 12/12/2019  8:22 PM      START taking these medications    Details   cephALEXin (KEFLEX) 500 MG capsule Take 1 capsule (500 mg) by mouth 4 times daily for 5 days, Disp-20 capsule, R-0, Local Print      sulfamethoxazole-trimethoprim (BACTRIM DS) 800-160 MG tablet Take 1 tablet by mouth 2 times daily for 5 days, Disp-10 tablet, R-0, Local Print             Francisco Ibanez  12/12/2019   [facility] EMERGENCY DEPARTMENT  Francisco CARTAGENA, am serving as a scribe at 8:04 PM on 12/12/2019 to document services personally performed by Rocio Pat PA based on my observations and the provider's statements to me.     This was created at least in part with a voice recognition software. Mistakes/typos may be present.      Rocio Pat PA  12/12/19 2040

## 2019-12-20 NOTE — ANESTHESIA POSTPROCEDURE EVALUATION
Patient: Daxa Wilkes    Procedure(s):  CHOLECYSTECTOMY, LAPAROSCOPIC    Diagnosis:unknown  Diagnosis Additional Information: No value filed.    Anesthesia Type:  General, ETT    Note:  Anesthesia Post Evaluation    Last vitals:  Vitals:    09/20/19 1845 09/20/19 1900 09/20/19 1915   BP: 119/77 120/81 117/81   Pulse: 95 84 93   Resp: 12 16 15   Temp:      SpO2: 95% 93% 94%         Electronically Signed By: Terry Jurado MD  December 20, 2019  6:29 AM

## 2020-03-01 ENCOUNTER — HEALTH MAINTENANCE LETTER (OUTPATIENT)
Age: 35
End: 2020-03-01

## 2020-03-26 ENCOUNTER — HOSPITAL ENCOUNTER (EMERGENCY)
Facility: CLINIC | Age: 35
Discharge: HOME | End: 2020-03-26
Attending: EMERGENCY MEDICINE | Admitting: EMERGENCY MEDICINE
Payer: COMMERCIAL

## 2020-03-26 VITALS
TEMPERATURE: 98 F | WEIGHT: 245 LBS | HEART RATE: 127 BPM | SYSTOLIC BLOOD PRESSURE: 151 MMHG | RESPIRATION RATE: 20 BRPM | OXYGEN SATURATION: 96 % | BODY MASS INDEX: 39.54 KG/M2 | DIASTOLIC BLOOD PRESSURE: 87 MMHG

## 2020-03-26 DIAGNOSIS — L03.311 CELLULITIS OF ABDOMINAL WALL: ICD-10-CM

## 2020-03-26 PROCEDURE — 99283 EMERGENCY DEPT VISIT LOW MDM: CPT

## 2020-03-26 RX ORDER — CEPHALEXIN 500 MG/1
500 CAPSULE ORAL 4 TIMES DAILY
Qty: 28 CAPSULE | Refills: 0 | Status: SHIPPED | OUTPATIENT
Start: 2020-03-26 | End: 2020-04-02

## 2020-03-26 RX ORDER — DOXYCYCLINE 100 MG/1
100 CAPSULE ORAL 2 TIMES DAILY
Qty: 14 CAPSULE | Refills: 0 | Status: SHIPPED | OUTPATIENT
Start: 2020-03-26 | End: 2020-03-26

## 2020-03-26 RX ORDER — SULFAMETHOXAZOLE/TRIMETHOPRIM 800-160 MG
1 TABLET ORAL 2 TIMES DAILY
Qty: 20 TABLET | Refills: 0 | Status: SHIPPED | OUTPATIENT
Start: 2020-03-26 | End: 2020-04-02

## 2020-03-26 ASSESSMENT — ENCOUNTER SYMPTOMS: FEVER: 0

## 2020-03-26 NOTE — ED PROVIDER NOTES
History     Chief Complaint:  Cyst       HPI   Dxaa Wilkes is a 34 year old female who presents with cyst. The patient developed a painful cyst of upper abdomen 3 days ago. She states that the cyst has been getting bigger and more painful. She has tried using hot packs at home around the area without significant improvement. She states that she has had cysts in the past that turned into abscess and required draining. She denies any fevers, V/D. In the past skin infections improved with bactrim and keflex (12/19 per chart review)    Allergies:  Penicillin G     Medications:    wellbutrin   levonorgestrel  levothyroxine   ranitidine  sertraline    Past Medical History:    Depressive disorder  GERD  HELLP syndrome   PUD  Thyroid disease     Past Surgical History:    Cholecystectomy     Family History:    Thyroid disease     Social History:  Smoking Status: Never Smoker  Smokeless Tobacco: Never Used  Alcohol Use: No  Drug Use: No  PCP: Jennifer West     Review of Systems   Constitutional: Negative for fever.   Skin:        Painful cyst under left breast   All other systems reviewed and are negative.        Physical Exam     Patient Vitals for the past 24 hrs:   BP Temp Temp src Pulse Resp SpO2 Weight   03/26/20 0633 -- -- -- -- -- 96 % --   03/26/20 0604 (!) 151/87 98  F (36.7  C) Oral 127 20 97 % 111.1 kg (245 lb)        Physical Exam  VS: Reviewed per above  HENT: Mucous membranes moist  EYES: sclera anicteric  CV: Rate as noted, regular rhythm.   RESP: Effort normal. Breath sounds are normal bilaterally.  GI: abdominal wall ttp over area of induration and erythema in the epigastrium with central excoriated lesion without rebound/guarding, not distended.  NEURO: Alert, moving all extremities  MSK: No deformity of the extremities  SKIN: Warm and dry, abdominal wall skin changes per above.       Emergency Department Course     Emergency Department Course:  Past medical records, nursing notes, and vitals  reviewed.    0618 I performed an exam of the patient as documented above.      Findings and plan explained to the Patient. Patient discharged home with instructions regarding supportive care, medications, and reasons to return. The importance of close follow-up was reviewed. The patient was prescribed keflex and bactrim DS.        Impression & Plan     Medical Decision Making:  Daxa Wilkes is a 34 year old female who presents to the emergency department today with epigastric abdominal wall redness and tenderness.  On arrival heart rate is 127 and improved to 114 at rest (regular rhythm).  She attributes her fast heart rate to significant pain at an 8 out of 10.  However she declines pain medicine stating she will take Tylenol when she gets home.  No fever and patient is well-appearing, thus I have a lower suspicion for occult sepsis.  Based on exam I do not suspect tracking of infection into the abdomen itself.  Exam is concerning for cellulitis versus abscess.  Bedside ultrasound that was not archived did not reveal evidence of abdominal wall fluid collection amenable to incision and drainage.  Per chart review, patient has a history of MRSA and responded to Keflex and Bactrim in December 2019 for a different skin wound.  We will plan to trial this combination again.  Area of redness and induration was outlined with a skin marker.  Encouraged wound check in 2 days.  Close return precautions were discussed prior to discharge.      Discharge Diagnosis:    ICD-10-CM    1. Cellulitis of abdominal wall  L03.311        Disposition:  The patient is discharged to home.    Discharge Medications:  New Prescriptions    CEPHALEXIN (KEFLEX) 500 MG CAPSULE    Take 1 capsule (500 mg) by mouth 4 times daily for 7 days    SULFAMETHOXAZOLE-TRIMETHOPRIM (BACTRIM DS) 800-160 MG TABLET    Take 1 tablet by mouth 2 times daily for 7 days       Scribe Disclosure:  Evert CARTAGENA am serving as a scribe at 6:18 AM on 3/26/2020 to  document services personally performed by Bronson Castle MD based on my observations and the provider's statements to me.      3/26/2020   Bronson Castle MD Lindenbaum, Elan, MD  03/26/20 0648

## 2020-03-26 NOTE — ED AVS SNAPSHOT
[facility] Emergency Department  [address]  [facility] [address]  Phone:  [phone]  Fax:  [phone]                                    Daxa Wilkes   MRN: [id number]    Department:  [facility] Emergency Department   Date of Visit:  3/26/2020           After Visit Summary Signature Page    I have received my discharge instructions, and my questions have been answered. I have discussed any challenges I see with this plan with the nurse or doctor.    ..........................................................................................................................................  Patient/Patient Representative Signature      ..........................................................................................................................................  Patient Representative Print Name and Relationship to Patient    ..................................................               ................................................  Date                                   Time    ..........................................................................................................................................  Reviewed by Signature/Title    ...................................................              ..............................................  Date                                               Time          22EPIC Rev 08/18

## 2020-09-24 ENCOUNTER — HOSPITAL ENCOUNTER (EMERGENCY)
Facility: CLINIC | Age: 35
Discharge: HOME | End: 2020-09-24
Attending: EMERGENCY MEDICINE | Admitting: EMERGENCY MEDICINE
Payer: COMMERCIAL

## 2020-09-24 VITALS
RESPIRATION RATE: 20 BRPM | SYSTOLIC BLOOD PRESSURE: 126 MMHG | HEART RATE: 77 BPM | DIASTOLIC BLOOD PRESSURE: 81 MMHG | OXYGEN SATURATION: 93 % | TEMPERATURE: 98 F

## 2020-09-24 DIAGNOSIS — L03.115 CELLULITIS OF RIGHT LOWER EXTREMITY: ICD-10-CM

## 2020-09-24 PROCEDURE — 87070 CULTURE OTHR SPECIMN AEROBIC: CPT | Performed by: EMERGENCY MEDICINE

## 2020-09-24 PROCEDURE — 25000128 H RX IP 250 OP 636: Performed by: EMERGENCY MEDICINE

## 2020-09-24 PROCEDURE — 25800030 ZZH RX IP 258 OP 636: Performed by: EMERGENCY MEDICINE

## 2020-09-24 PROCEDURE — 25000125 ZZHC RX 250: Performed by: EMERGENCY MEDICINE

## 2020-09-24 PROCEDURE — 96374 THER/PROPH/DIAG INJ IV PUSH: CPT

## 2020-09-24 PROCEDURE — 96375 TX/PRO/DX INJ NEW DRUG ADDON: CPT

## 2020-09-24 PROCEDURE — 87077 CULTURE AEROBIC IDENTIFY: CPT | Performed by: EMERGENCY MEDICINE

## 2020-09-24 PROCEDURE — 99284 EMERGENCY DEPT VISIT MOD MDM: CPT

## 2020-09-24 PROCEDURE — 87186 SC STD MICRODIL/AGAR DIL: CPT | Performed by: EMERGENCY MEDICINE

## 2020-09-24 RX ORDER — MUPIROCIN 20 MG/G
OINTMENT TOPICAL 3 TIMES DAILY
Qty: 22 G | Refills: 0 | Status: SHIPPED | OUTPATIENT
Start: 2020-09-24 | End: 2020-10-04

## 2020-09-24 RX ORDER — CEFAZOLIN SODIUM 1 G/50ML
2000 SOLUTION INTRAVENOUS ONCE
Status: COMPLETED | OUTPATIENT
Start: 2020-09-24 | End: 2020-09-24

## 2020-09-24 RX ORDER — DIPHENHYDRAMINE HYDROCHLORIDE 50 MG/ML
50 INJECTION INTRAMUSCULAR; INTRAVENOUS ONCE
Status: COMPLETED | OUTPATIENT
Start: 2020-09-24 | End: 2020-09-24

## 2020-09-24 RX ADMIN — FAMOTIDINE 20 MG: 10 INJECTION, SOLUTION INTRAVENOUS at 10:07

## 2020-09-24 RX ADMIN — DIPHENHYDRAMINE HYDROCHLORIDE 50 MG: 50 INJECTION, SOLUTION INTRAMUSCULAR; INTRAVENOUS at 10:07

## 2020-09-24 RX ADMIN — VANCOMYCIN HYDROCHLORIDE 2000 MG: 10 INJECTION, POWDER, LYOPHILIZED, FOR SOLUTION INTRAVENOUS at 08:10

## 2020-09-24 ASSESSMENT — ENCOUNTER SYMPTOMS
PALPITATIONS: 0
COUGH: 0
SHORTNESS OF BREATH: 0
CHILLS: 0
ABDOMINAL PAIN: 0
CONSTIPATION: 0
DIARRHEA: 0
WOUND: 1
NAUSEA: 0
VOMITING: 0
FEVER: 0

## 2020-09-24 NOTE — ED TRIAGE NOTES
Sore on right lower leg that started as a pimple on Friday. Now leg is swelling and pain is worsening. Saw her MD at clinic, had MRSA swab done but no result yet, prescribed doxycycline. ABCs intact.

## 2020-09-24 NOTE — ED AVS SNAPSHOT
[facility] Emergency Department  [address]  [facility] [address]  Phone:  [phone]  Fax:  [phone]                                    Daxa Wilkes   MRN: [id number]    Department:  [facility] Emergency Department   Date of Visit:  9/24/2020           After Visit Summary Signature Page    I have received my discharge instructions, and my questions have been answered. I have discussed any challenges I see with this plan with the nurse or doctor.    ..........................................................................................................................................  Patient/Patient Representative Signature      ..........................................................................................................................................  Patient Representative Print Name and Relationship to Patient    ..................................................               ................................................  Date                                   Time    ..........................................................................................................................................  Reviewed by Signature/Title    ...................................................              ..............................................  Date                                               Time          22EPIC Rev 08/18

## 2020-09-24 NOTE — RESULT ENCOUNTER NOTE
Emergency Dept discharge antibiotic prescribed: MUPIROCIN (BACTROBAN) 2 % EXTERNAL OINTMENT,  Apply topically 3 times daily for 10 days AND On Doxycycline prior  Incision and Drainage performed in [place] ED [Yes or No]: no  As per  ED lab result protocol, consult with ED provider, if indicated, or await final culture result.

## 2020-09-24 NOTE — ED PROVIDER NOTES
"  History     Chief Complaint:  Wound Check    HPI   Daxa Wilkes is a 34 year old female with a history of MRSA who presents for a wound check. The patient reports 6 days ago she was at  with a client and she brushed a pimple on her right ankle and it popped, she cleaned the wound with neosporin and applied a Band-Aid. The next morning the wound increased in side so she applied wound  from a previous surgery. Over the next few days, she notes, the wound worsened and 2 days ago it became increasingly swollen and red, prompting her to go to . She was taking Bactrim and Doxycycline, but the  doctor \"yelled at her\" for taking Bactrim, so now she is only taking Doxycycline.      Allergies:  Penicillin G      Medications:    Albuterol     Wellbutrin XL   Mirena    Levothyroxine   Senna-docusate  Sertraline     Past Medical History:    Depressive disorder   GERD   HELLP syndrome  Cellulitis   Peptic ulcer disease  Shoulder abscess   Thyroid disease  MRSA      Past Surgical History:    Irrigation and debridement trunk  Cholecystectomy     Family History:    Father: thyroid disease  Brother: thyroid disease  Sister: thyroid disease  Paternal grandparents: thyroid disease    Social History:  Smoking Status: Never Smoker  Smokeless Tobacco: Never Used  Alcohol Use: Negative   Drug Use: Negative  PCP: Jennifer West   Marital Status:        Review of Systems   Constitutional: Negative for chills and fever.   Respiratory: Negative for cough and shortness of breath.    Cardiovascular: Negative for chest pain and palpitations.   Gastrointestinal: Negative for abdominal pain, constipation, diarrhea, nausea and vomiting.   Skin: Positive for wound.   All other systems reviewed and are negative.      Physical Exam     Patient Vitals for the past 24 hrs:   BP Temp Temp src Pulse Resp SpO2   09/24/20 0706 (!) 129/93 98  F (36.7  C) Oral 123 20 98 %     Physical Exam    GEN: alert    HEAD: atraumatic    EYES: " pupils reactive, extraocular muscles intact, conjunctivae normal    ENT: TMs normal as are EACs; nares patent; normal posterior pharynx and oral mucosa    NECK: no posterior midline tenderness, no meningeal signs, trachea midline     RESPIRATORY: no tachypnea, breath sounds clear to auscultation    CVS: normal S1/S2, no murmurs/rubs/gallops; Heart rate is 70s and regular (triage noted 123)    ABDOMEN: soft, nontender, no masses or organomegaly, no rebound, positive bowel sounds    MUSCULOSKELETAL: no deformities    SKIN: 4x4 cm area of erythema with 1.5x2 cm area of skin in the center and vesicular area with a serosanguinous drainage. Increased capilllary refill. Hyperemic. No ascending lymphangitis.  Minimal swelling to the affected area    NEURO: GCS 15, cranial nerves intact.  Motor and sensory- good tone; normal gait and coordination    LYMPH: no inguinal lymphadenopathy        Emergency Department Course     Laboratory:  Laboratory findings were communicated with the patient who voiced understanding of the findings.    Abscess Culture Aerobic bacterial: pending    Interventions:  1810 Vancocin 2000 mg IV    1007 Benadryl 50 mg IV    1007 Pepcid 20 mg IV    Emergency Department Course:  Past medical records, nursing notes, and vitals reviewed.    0720 I performed an exam of the patient as documented above.     0733 I rechecked the patient and discussed the results of their workup thus far.     Findings and plan explained to the Patient. Patient discharged home with instructions regarding supportive care, medications, and reasons to return. The importance of close follow-up was reviewed.     Impression & Plan       Medical Decision Making:  The patient is a 34-year-old female with a previous history of MRSA who has no history of diabetes and presents with a leg wound and cellulitis.  There is seepage from the center of the wound that appears serosanguineous and I ran a another culture of the fluid today.  The  patient has been on doxycycline for less than 48 hours and prior to that had had several days of Bactrim that she had on hand at home.    She denies any systemic symptoms such as fever or chills, myalgias/arthralgias and no vomiting.  She has no evidence of ascending lymphangitis nor abscess and again the wound is draining some fluid as mentioned.  Because of her history of MRSA I did give her a dose of vancomycin here and I am going to start her on mupirocin 2-3 times daily.  She should cover this with an occlusive Tegaderm dressing.  I am also going to give her a work slip to be off the next several days to elevate this above her heart.  She should follow-up in 48 hours with her PCP office which is [facility] to have this rechecked.  If she should develop new symptoms or issues in the interim she should return here.    Diagnosis:    ICD-10-CM    1. Cellulitis of right lower extremity  L03.115 Abscess Culture Aerobic Bacterial     Disposition:  Discharged to home.    Discharge Medications:  New Prescriptions    MUPIROCIN (BACTROBAN) 2 % EXTERNAL OINTMENT    Apply topically 3 times daily for 10 days     Scribe Disclosure:  I, Kavita Bedoya, am serving as a scribe at 7:20 AM on 9/24/2020 to document services personally performed by Fidel Cruz MD based on my observations and the provider's statements to me.        Fidel Cruz MD  09/24/20 1941

## 2020-09-24 NOTE — DISCHARGE INSTRUCTIONS
CONTINUE DOXYCYCLINE AND START BACTROBAN (MUPIROCIN) 3x DAILY FOR 10 DAYS.    Discharge Instructions  Cellulitis    Cellulitis is an infection of the skin that occurs when bacteria enter the skin.   Symptoms are generally redness, swelling, warmth and pain.  Your infection appeared to be appropriate to treat at home with antibiotics.  However, sometimes your infection may be worse than it seemed at first, or may worsen with time. If you have new or worse symptoms, you may need to be seen again in the Emergency Department or by your primary doctor.    Return to the Emergency Department if:  The redness, pain, or swelling gets a lot worse.  If the red area was marked, return if it is red beyond the marked area.  You are unable to get your antibiotics, or are vomiting them up or you can t take them.  You are feeling more ill, weak or lightheaded.  You start to run a new fever (temperature >101).  Anything else about the infection worries or concerns you.  Treatment:  Start your antibiotics right away, and take them as prescribed. Be sure to finish the whole prescription, even if you are better.  Apply a heating pad, warm packs, or warm water soaks to the infected area for 15 minutes at a time, at least 3 times a day. Do not use a heating pad on your feet or legs if you have diabetes. Do not sleep with a heating pad on, since this can cause burns or skin injury.  Rest your injured area for at least 1-2 days. After that you may start using your extremity again as long as there is not too much pain.   Raise the injured area above the level of your heart as much as possible in the first 1-2 days.  Tylenol  (acetaminophen), Motrin  (ibuprofen), or Advil  (ibuprofen) may help may help reduce pain and fever and may help you feel more comfortable. Be sure to read and follow the package directions, and ask your doctor if you have questions.    Follow-up with your doctor:  Re-check in clinic within 2-3 days.  Probiotics: If  "you have been given an antibiotic, you may want to also take a probiotic pill or eat yogurt with live cultures. Probiotics have \"good bacteria\" to help your intestines stay healthy. Studies have shown that probiotics help prevent diarrhea and other intestine problems (including C. diff infection) when you take antibiotics. You can buy these without a prescription in the pharmacy section of the store.     If you were given a prescription for medicine here today, be sure to read all of the information (including the package insert) that comes with your prescription.  This will include important information about the medicine, its side effects, and any warnings that you need to know about.  The pharmacist who fills the prescription can provide more information and answer questions you may have about the medicine.  If you have questions or concerns that the pharmacist cannot address, please call or return to the Emergency Department.     Opioid Medication Information    Pain medications are among the most commonly prescribed medicines, so we are including this information for all our patients. If you did not receive pain medication or get a prescription for pain medicine, you can ignore it.     You may have been given a prescription for an opioid (narcotic) pain medicine and/or have received a pain medicine while here in the Emergency Department. These medicines can make you drowsy or impaired. You must not drive, operate dangerous equipment, or engage in any other dangerous activities while taking these medications. If you drive while taking these medications, you could be arrested for DUI, or driving under the influence. Do not drink any alcohol while you are taking these medications.     Opioid pain medications can cause addiction. If you have a history of chemical dependency of any type, you are at a higher risk of becoming addicted to pain medications.  Only take these prescribed medications to treat your pain when " all other options have been tried. Take it for as short a time and as few doses as possible. Store your pain pills in a secure place, as they are frequently stolen and provide a dangerous opportunity for children or visitors in your house to start abusing these powerful medications. We will not replace any lost or stolen medicine.  As soon as your pain is better, you should flush all your remaining medication.     Many prescription pain medications contain Tylenol  (acetaminophen), including Vicodin , Tylenol #3 , Norco , Lortab , and Percocet .  You should not take any extra pills of Tylenol  if you are using these prescription medications or you can get very sick.  Do not ever take more than 3000 mg of acetaminophen in any 24 hour period.    All opioids tend to cause constipation. Drink plenty of water and eat foods that have a lot of fiber, such as fruits, vegetables, prune juice, apple juice and high fiber cereal.  Take a laxative if you don t move your bowels at least every other day. Miralax , Milk of Magnesia, Colace , or Senna  can be used to keep you regular.      Remember that you can always come back to the Emergency Department if you are not able to see your regular doctor in the amount of time listed above, if you get any new symptoms, or if there is anything that worries you.

## 2020-09-26 ENCOUNTER — TELEPHONE (OUTPATIENT)
Dept: EMERGENCY MEDICINE | Facility: CLINIC | Age: 35
End: 2020-09-26

## 2020-09-26 LAB
BACTERIA SPEC CULT: ABNORMAL
Lab: ABNORMAL
SPECIMEN SOURCE: ABNORMAL

## 2020-09-26 NOTE — TELEPHONE ENCOUNTER
"[facility] Emergency Department Lab result notification:    [place] ED lab result protocol used  General cx    Reason for call  Notify of lab results, assess symptoms,  review ED providers recommendations/discharge instructions (if necessary) and advise per ED lab result f/u protocol    Lab Result   Final Abscess culture (right lower leg) report on 9/26/20   Emergency Dept discharge antibiotic prescribed: MUPIROCIN (BACTROBAN) 2 % EXTERNAL OINTMENT,  Apply topically 3 times daily for 10 days AND On Doxycycline prior   #1. Bacteria, Moderate growth Methicillin resistant Staphylococcus aureus, which is [SUSCEPTIBLE] to antibiotic - Doxycycline.   Incision and Drainage performed in the [place] ED: No   Patient to be notified of result, symptoms assessed and advised per [place] ED lab result protocol.     Information table from ED Provider visit on 9/24/20  Symptoms reported at ED visit (Chief complaint, HPI) Chief Complaint:  Wound Check   HPI   Daxa Wilkes is a 34 year old female with a history of MRSA who presents for a wound check. The patient reports 6 days ago she was at  with a client and she brushed a pimple on her right ankle and it popped, she cleaned the wound with neosporin and applied a Band-Aid. The next morning the wound increased in side so she applied wound  from a previous surgery. Over the next few days, she notes, the wound worsened and 2 days ago it became increasingly swollen and red, prompting her to go to . She was taking Bactrim and Doxycycline, but the  doctor \"yelled at her\" for taking Bactrim, so now she is only taking Doxycycline.     ED providers Impression and Plan (applicable information) The patient is a 34-year-old female with a previous history of MRSA who has no history of diabetes and presents with a leg wound and cellulitis.  There is seepage from the center of the wound that appears serosanguineous and I ran a another culture of the fluid today.  " The patient has been on doxycycline for less than 48 hours and prior to that had had several days of Bactrim that she had on hand at home.     She denies any systemic symptoms such as fever or chills, myalgias/arthralgias and no vomiting.  She has no evidence of ascending lymphangitis nor abscess and again the wound is draining some fluid as mentioned.  Because of her history of MRSA I did give her a dose of vancomycin here and I am going to start her on mupirocin 2-3 times daily.  She should cover this with an occlusive Tegaderm dressing.  I am also going to give her a work slip to be off the next several days to elevate this above her heart.  She should follow-up in 48 hours with her PCP office which is [facility] to have this rechecked.  If she should develop new symptoms or issues in the interim she should return here.   Miscellaneous information NA     RN Assessment (Patient s current Symptoms), include time called.  [Insert Left message here if message left]  At 4:56P, Left voicemail message requesting a call back to [facility] ED Lab Result RN at [phone].  RN is available every day between 10 a.m. and 6:30 p.m..    [RN Name]  Rodolfo Mccartney RN  [facility]  Emergency Dept Lab Result RN  Ph# [phone]      Copy of Lab result

## 2020-09-26 NOTE — RESULT ENCOUNTER NOTE
Final Abscess culture (right lower leg) report on 9/26/20  Emergency Dept discharge antibiotic prescribed: MUPIROCIN (BACTROBAN) 2 % EXTERNAL OINTMENT,  Apply topically 3 times daily for 10 days AND On Doxycycline prior   #1. Bacteria, Moderate growth Methicillin resistant Staphylococcus aureus, which is [SUSCEPTIBLE] to antibiotic - Doxycycline.  Incision and Drainage performed in the [place] ED: No  Patient to be notified of result, symptoms assessed and advised per [place] ED lab result protocol.

## 2020-09-26 NOTE — TELEPHONE ENCOUNTER
Daxa returned call.  She was notified of culture results showing MRSA (Methicillin Resistant Staph Aureus). She states the redness is less and the center of the wound as a greenish appearance.  Foot and ankle continue to be swollen.  Swelling in less in the morning.  Denies fever.  She has been taking the Doxycycline as directed and applying the ointment 3 times daily.    Elevating leg throughout day as directed.  She has not f/u with PCP as directed by ED Provider.  She was encouraged to f/u with PCP on Monday.  Consider urgent care or returning to ED if symptoms are worse.    Rodolfo Mccartney RN  [facility]  Emergency Dept Lab Result RN  Ph# [phone]

## 2020-12-13 ENCOUNTER — HEALTH MAINTENANCE LETTER (OUTPATIENT)
Age: 35
End: 2020-12-13

## 2021-01-11 ENCOUNTER — APPOINTMENT (OUTPATIENT)
Dept: GENERAL RADIOLOGY | Facility: CLINIC | Age: 36
End: 2021-01-11
Attending: PHYSICIAN ASSISTANT
Payer: COMMERCIAL

## 2021-01-11 ENCOUNTER — HOSPITAL ENCOUNTER (EMERGENCY)
Facility: CLINIC | Age: 36
Discharge: HOME | End: 2021-01-11
Attending: PHYSICIAN ASSISTANT | Admitting: PHYSICIAN ASSISTANT
Payer: COMMERCIAL

## 2021-01-11 VITALS
HEART RATE: 91 BPM | DIASTOLIC BLOOD PRESSURE: 89 MMHG | TEMPERATURE: 97.6 F | RESPIRATION RATE: 18 BRPM | SYSTOLIC BLOOD PRESSURE: 139 MMHG | OXYGEN SATURATION: 98 %

## 2021-01-11 DIAGNOSIS — Z86.14 HISTORY OF MRSA INFECTION: ICD-10-CM

## 2021-01-11 DIAGNOSIS — L02.416 ABSCESS OF LEFT LEG: ICD-10-CM

## 2021-01-11 PROCEDURE — 73562 X-RAY EXAM OF KNEE 3: CPT | Mod: LT

## 2021-01-11 PROCEDURE — 250N000013 HC RX MED GY IP 250 OP 250 PS 637: Performed by: PHYSICIAN ASSISTANT

## 2021-01-11 PROCEDURE — 10060 I&D ABSCESS SIMPLE/SINGLE: CPT

## 2021-01-11 PROCEDURE — 99283 EMERGENCY DEPT VISIT LOW MDM: CPT | Mod: 25

## 2021-01-11 RX ORDER — HYDROCODONE BITARTRATE AND ACETAMINOPHEN 5; 325 MG/1; MG/1
1 TABLET ORAL EVERY 6 HOURS PRN
Qty: 6 TABLET | Refills: 0 | Status: SHIPPED | OUTPATIENT
Start: 2021-01-11 | End: 2021-01-14

## 2021-01-11 RX ORDER — DOXYCYCLINE 100 MG/1
100 CAPSULE ORAL 2 TIMES DAILY
Qty: 20 CAPSULE | Refills: 0 | Status: SHIPPED | OUTPATIENT
Start: 2021-01-11 | End: 2021-01-21

## 2021-01-11 RX ORDER — LIDOCAINE HYDROCHLORIDE AND EPINEPHRINE 10; 10 MG/ML; UG/ML
INJECTION, SOLUTION INFILTRATION; PERINEURAL
Status: DISCONTINUED
Start: 2021-01-11 | End: 2021-01-11 | Stop reason: HOSPADM

## 2021-01-11 RX ORDER — OXYCODONE AND ACETAMINOPHEN 5; 325 MG/1; MG/1
1 TABLET ORAL ONCE
Status: COMPLETED | OUTPATIENT
Start: 2021-01-11 | End: 2021-01-11

## 2021-01-11 RX ADMIN — OXYCODONE HYDROCHLORIDE AND ACETAMINOPHEN 1 TABLET: 5; 325 TABLET ORAL at 17:50

## 2021-01-11 ASSESSMENT — ENCOUNTER SYMPTOMS
CHILLS: 0
FEVER: 0
WOUND: 1
MYALGIAS: 0

## 2021-01-11 NOTE — ED TRIAGE NOTES
Patient presents to the ED with left lateral knee redness and swelling. States thinks has an abscess, states has had them in the past. Patient requests an I&D, reports antibiotics without intervention have not worked for her in the past.

## 2021-01-11 NOTE — ED AVS SNAPSHOT
[facility] Emergency Dept  [address]  [facility] [address]  Phone: [phone]  Fax: [phone]                                    Daxa Wilkes   MRN: [id number]    Department: [facility] Emergency Dept   Date of Visit: 1/11/2021           After Visit Summary Signature Page    I have received my discharge instructions, and my questions have been answered. I have discussed any challenges I see with this plan with the nurse or doctor.    ..........................................................................................................................................  Patient/Patient Representative Signature      ..........................................................................................................................................  Patient Representative Print Name and Relationship to Patient    ..................................................               ................................................  Date                                   Time    ..........................................................................................................................................  Reviewed by Signature/Title    ...................................................              ..............................................  Date                                               Time          22EPIC Rev 08/18

## 2021-01-11 NOTE — ED PROVIDER NOTES
History   Chief Complaint:  Wound Check       HPI   Daxa Wilkes is a 35 year old female with history of MRSA who presents for a wound check. Per chart review, the patient was last seen for a wound check on her ankle  on 9/24/20. This was treated with a course of Doxycycline which she stated helped but the wound still took a long time to heal and close. Then 3 days ago she scraped the outside of her knee on something and the next day noted a pus pocket had formed and then subsequently popped on its own. She continued to hot pack the area with no additional drainage. The pain in her knee is exacerbated by bending her knee. 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 She denied any fever, chills, or myalgias.    Review of Systems   Constitutional: Negative for chills and fever.   Musculoskeletal: Negative for myalgias.   Skin: Positive for wound.   All other systems reviewed and are negative.    Allergies:  Penicillin G       Medications:    Albuterol     Wellbutrin XL   Mirena    Levothyroxine   Senna-docusate  Sertraline      Past Medical History:    Depressive disorder      GERD   HELLP syndrome  Cellulitis   Peptic ulcer disease  Shoulder abscess   Thyroid disease  MRSA                   Past Surgical History:    Irrigation and debridement trunk  Cholecystectomy      Family History:    Father: thyroid disease  Brother: thyroid disease  Sister: thyroid disease  Paternal grandparents: thyroid disease     Social History:  Smoking Status: Never Smoker  Smokeless Tobacco: Never Used  Alcohol Use: Negative   Drug Use: Negative  PCP: Jennifer West   Marital Status:        Social History:  Marital Status:    Occupation: Medical assistant on pediatric floor     Physical Exam     Patient Vitals for the past 24 hrs:   BP Temp Pulse Resp SpO2   01/11/21 1840 -- -- -- -- 98 %   01/11/21 1835 139/89 -- 91 -- 98 %   01/11/21 1719 (!)  138/94 -- -- -- --   01/11/21 1718 -- 97.6  F (36.4  C) 110 18 98 %       Physical Exam  General: Alert and interactive. Appears well.   Head: Atraumatic, without obvious lesion, abrasion, hematoma.   Eyes: The pupils are equal and round. No scleral icterus.   ENT: No obvious abnormalities to the ears or nose. Mucous membranes moist.   Neck:Trachea is in the midline. No obvious swelling to the neck. Full range of motion.   CV: Regular rate. Extremities well perfused. Capillary refill brisk in toes. DP pulsations normal.   Resp: Non-labored, no retractions or accessory muscle use.     GI: Abdomen is not distended.   MS: Moving all extremities well. Decreased ROM to left knee because of pain overlying abscess. No swelling or erythema to knee joint itself. Patellar grind is negative.   Skin: Small abscess to the lateral aspect of the knee, superior to the patella. There is surrounding erythema, about 3-4 cm in diameter. There is induration but no fluctuance. Minimal obvious or palpable abscess. This is pictured below.         Neuro: Alert and oriented x 3. Non-focal examination.    Psych: Awake. Alert.  Normal affect. Appropriate interactions.    Emergency Department Course     Imaging:    XR Knee 3 views, Left:   Normal joint spaces and alignment. No fracture or joint effusion, as per radiology.      Procedures    Incision and Drainage     LOCATIONS:  Lateral left knee     ANESTHESIA:  Local field block using Lidocaine 1% with epinephrine, total of 3 mLs     PREPARATION:  Cleansed with Betadine     PROCEDURE:  Area was incised with # 11 Blade ([place]) with a Single Straight incision.  Wound treatment included minimal Purulent Drainage.  Packing consisted of No Packing. Appropriate dressing was applied to cover the area.    PATIENT STATUS:        Patient tolerated the procedure well. There were no complications.    Emergency Department Course:    Reviewed:  I reviewed the patient's nursing notes, vitals, past  medical history and care everywhere.     Assessments:  1731 I evaluated the patient.   1824 I rechecked the patient and I performed the incision and drainage procedure as noted above.       Interventions:  1750 Percocet 5-325 mg 1 tablet PO     Disposition:  The patient was discharged to home.       Impression & Plan     Medical Decision Making:  Daxa Wilkes is a 35 year old female who presents for evaluation of erythema and swelling to the lateral aspect of the left knee. Patient has a history of MRSA and has had multiple abscesses in the past. She is requesting incision and drainage tonight as antibiotics alone do not seem to treat her infections. The abscess was very small, and I addressed the risks and benefits of incision and drainage. I expressed concern that I would not be able to get much purulence out of the wound given its small size and early stage. She requests incision and drainage regardless. I did make an incision and was able to get a small amount of purulence out of the wound. It is so small that I am unable to pack it. We will start her on doxycycline given her history of MRSA. She has decreased range of motion the left knee due to pain, but there is no erythema surrounding the joint itself, and I have a lower suspicion for septic arthritis given her lack of systemic symptoms and the localized swelling to the abscess in the lateral aspect of the knee. XR negative for bony infection or swelling. She has a follow-up scheduled already with primary care in a few days for wound recheck. Otherwise, we will have her continue hot compresses, massaging purulence out of the wound, and will watch for rapidly spreading redness, new fevers or chills, or other systemic symptoms, which would prompt evaluation back here.    Diagnosis:    ICD-10-CM    1. Abscess of left leg  L02.416    2. History of MRSA infection  Z86.14        Discharge Medications:  Discharge Medication List as of 1/11/2021  6:34 PM       START taking these medications    Details   doxycycline hyclate (VIBRAMYCIN) 100 MG capsule Take 1 capsule (100 mg) by mouth 2 times daily for 10 days, Disp-20 capsule, R-0, Local Print      HYDROcodone-acetaminophen (NORCO) 5-325 MG tablet Take 1 tablet by mouth every 6 hours as needed for severe pain, Disp-6 tablet, R-0, Local Print             Scribe Disclosure:  I, Sharda Roa, am serving as a scribe at 5:31 PM on 1/11/2021 to document services personally performed by Jennifer Calloway PA based on my observations and the provider's statements to me.        Jennifer Calloway PA-LUIS  01/11/21 2039

## 2021-01-12 NOTE — DISCHARGE INSTRUCTIONS
Use a hot compress three times daily.   Massage as best as you can.   Take Doxycycline twice daily for the next 10 days.   Follow up with primary care in two days for recheck.   Return if rapidly spreading redness, new fevers or chills.

## 2021-04-14 ENCOUNTER — HOSPITAL ENCOUNTER (EMERGENCY)
Facility: CLINIC | Age: 36
Discharge: HOME | End: 2021-04-14
Attending: PHYSICIAN ASSISTANT | Admitting: PHYSICIAN ASSISTANT
Payer: COMMERCIAL

## 2021-04-14 ENCOUNTER — APPOINTMENT (OUTPATIENT)
Dept: ULTRASOUND IMAGING | Facility: CLINIC | Age: 36
End: 2021-04-14
Attending: PHYSICIAN ASSISTANT
Payer: COMMERCIAL

## 2021-04-14 VITALS
TEMPERATURE: 96.4 F | SYSTOLIC BLOOD PRESSURE: 136 MMHG | OXYGEN SATURATION: 98 % | RESPIRATION RATE: 20 BRPM | BODY MASS INDEX: 41.78 KG/M2 | HEART RATE: 97 BPM | HEIGHT: 66 IN | DIASTOLIC BLOOD PRESSURE: 86 MMHG | WEIGHT: 260 LBS

## 2021-04-14 DIAGNOSIS — R22.32 AXILLARY MASS, LEFT: ICD-10-CM

## 2021-04-14 DIAGNOSIS — L02.412 ABSCESS OF LEFT AXILLA: ICD-10-CM

## 2021-04-14 PROCEDURE — 99284 EMERGENCY DEPT VISIT MOD MDM: CPT | Mod: 25

## 2021-04-14 PROCEDURE — 76882 US LMTD JT/FCL EVL NVASC XTR: CPT | Mod: LT

## 2021-04-14 RX ORDER — DOXYCYCLINE 100 MG/1
100 CAPSULE ORAL 2 TIMES DAILY
Qty: 14 CAPSULE | Refills: 0 | Status: SHIPPED | OUTPATIENT
Start: 2021-04-14 | End: 2021-04-21

## 2021-04-14 RX ORDER — LIDOCAINE HYDROCHLORIDE AND EPINEPHRINE 10; 10 MG/ML; UG/ML
INJECTION, SOLUTION INFILTRATION; PERINEURAL
Status: DISCONTINUED
Start: 2021-04-14 | End: 2021-04-14 | Stop reason: HOSPADM

## 2021-04-14 RX ORDER — HYDROCODONE BITARTRATE AND ACETAMINOPHEN 5; 325 MG/1; MG/1
1 TABLET ORAL EVERY 6 HOURS PRN
Qty: 10 TABLET | Refills: 0 | Status: SHIPPED | OUTPATIENT
Start: 2021-04-14 | End: 2021-04-17

## 2021-04-14 ASSESSMENT — MIFFLIN-ST. JEOR: SCORE: 1886.1

## 2021-04-14 ASSESSMENT — ENCOUNTER SYMPTOMS: FEVER: 0

## 2021-04-14 NOTE — ED TRIAGE NOTES
Pt c/o cyst in left axilla. States it was the size of a pea last week but yesterday was much larger. Today it is hard and very painful.

## 2021-04-14 NOTE — DISCHARGE INSTRUCTIONS
Your ultrasound shows only a very small collection of fluid in the area of swelling in your armpit.  At this time it is unclear whether this truly represents a abscess or infection however given your history I think it is reasonable to cover you with antibiotics.  You should follow-up with your primary care provider within 2 to 3 days for recheck to ensure that this is improving and resolving and if not additional imaging or biopsy may be indicated.  You should return if fever, spreading redness, significant new swelling, or other new or worsening symptoms.

## 2021-04-14 NOTE — ED PROVIDER NOTES
"  History   Chief Complaint:  Cyst     HPI   Daxa Wilkes is a 35 year old female with history of MRSA and past ED visits for abscess and cellulitis who presents with a cyst. She reports this cyst is located in her left axilla and was the size of a pea last week but became much larger yesterday. She states it is now hard and more painful, and she cannot put pressure on it. Notes a history of similar cyst or abscess in this area 10 years ago which was drained. States she has been put on Bactrim in the past but it has not worked well. Denies any fever or other concerning symptoms.     Review of Systems   Constitutional: Negative for fever.   Skin:        Cyst, hard and painful   All other systems reviewed and are negative.      Allergies:  Lac Bovis  Penicillin G    Medications:  Buspar   Atarax  Effexor   Albuterol  Synthroid     Past Medical History:    Depressive disorder  GERD  Peptic ulcer disease  Hypothyroidism   Cellulitis  Shoulder abscess    Anxiety  IBS    Past Surgical History:    I & D shoulder abscess  Cholecystectomy    [place] teeth  Knee surgery     Family History:    Thyroid disease  Depression  Anxiety    Social History:  Patient presents alone.  PCP: Dr. West     Physical Exam     Patient Vitals for the past 24 hrs:   BP Temp Temp src Pulse Resp SpO2 Height Weight   04/14/21 1645 136/86 -- -- 97 -- 98 % -- --   04/14/21 1516 (!) 133/93 96.4  F (35.8  C) Oral 120 20 97 % 1.676 m (5' 6\") 117.9 kg (260 lb)       Physical Exam  General: Awake, alert, pleasant, non-toxic.  Head:  Scalp is NC/AT  Eyes:  Conjunctiva normal, PERRL  ENT:  The external nose and ears are normal.   Neck:  Normal range of motion without rigidity.  CV:  Regular rate and rhythm    No pathologic murmur, rubs, or gallops.  Resp:  Breath sounds are clear bilaterally.  No crackles, wheezes, rhonchi, stridor.    Non-labored, no retractions or accessory muscle use  Abdomen: Abdomen is soft, no distension, no tenderness, no masses. " No CVA tenderness.  MS:  No lower extremity edema or asymmetric calf swelling. Normal ROM in all joints without effusions.    No midline cervical, thoracic, or lumbar tenderness  Skin:  There is area of swelling in the left axillary area which is tender to palpation with minimal surrounding redness.  Warm and dry, 2+ peripheral pulses in all extremities  Neuro: Alert and oriented x3.  No gross motor deficits.  No facial asymmetry.  Psych: Awake. Alert. Normal affect. Appropriate interactions.    Emergency Department Course     Imaging:    US Extremity Non Vascular Left:  Nonspecific irregular heterogeneous hypoechoic 0.6 x 0.6 x   0.6 cm collection in the region of a palpable lump. Small abscess is   not excluded. However, no adjacent hyperemia is noted on color   Doppler, as per radiology.     Emergency Department Course:    Reviewed:  I reviewed the patient's nursing notes, vitals, past medical records, Care Everywhere.     Assessments:  1535    I evaluated the patient and performed an exam as above.    1545 I numbed patient's axilla in preparation for I & D but did not perform after US obtained.     1655    I updated the patient on results and discussed plan of care.    Disposition:  The patient was discharged to home.     Impression & Plan     Medical Decision Makin-year-old female presents with painful swelling in the left axillary area.  Broad differential considered.  Notes she has a history of similar abscesses/cysts which have been drained and prior history of MRSA.  No obvious fluctuance and therefore ultrasound obtained which does show very small collection of fluid within the region of the palpable lump only 6 mm in dimension.  We have decided together against attempting I&D at this time given the very small amount of potentially drainable fluid.  Could be abscess but also could be lymph node or other cystic structure.  Given the tenderness and redness will cover with doxycycline but urged that she  should follow-up with her primary care provider for further evaluation and imaging if not improving and regardless should be seen for wound re-check by early next week.  Initially tachycardic but this is normalized on recheck no fever or other systemic symptoms and she feels otherwise well.  Return precautions given for spreading redness, increasing swelling, fever, chills, or other new or worsening symptoms.      Diagnosis:    ICD-10-CM    1. Axillary mass, left  R22.32    2. Abscess of left axilla  L02.412        Discharge Medications:  Discharge Medication List as of 4/14/2021  4:59 PM      START taking these medications    Details   doxycycline hyclate (VIBRAMYCIN) 100 MG capsule Take 1 capsule (100 mg) by mouth 2 times daily for 7 days, Disp-14 capsule, R-0, Local Print      HYDROcodone-acetaminophen (NORCO) 5-325 MG tablet Take 1 tablet by mouth every 6 hours as needed for moderate to severe pain, Disp-10 tablet, R-0, Local Print             Scribe Disclosure:  I, Lynsey Alexandra, am serving as a scribe at 3:18 PM on 4/14/2021 to document services personally performed by Rayshawn Martinez PA-C based on my observations and the provider's statements to me.      Rayshawn Martinez PA-C  04/14/21 7518

## 2021-04-17 ENCOUNTER — HEALTH MAINTENANCE LETTER (OUTPATIENT)
Age: 36
End: 2021-04-17

## 2021-09-26 ENCOUNTER — HEALTH MAINTENANCE LETTER (OUTPATIENT)
Age: 36
End: 2021-09-26

## 2022-05-08 ENCOUNTER — HEALTH MAINTENANCE LETTER (OUTPATIENT)
Age: 37
End: 2022-05-08

## 2023-01-14 ENCOUNTER — HEALTH MAINTENANCE LETTER (OUTPATIENT)
Age: 38
End: 2023-01-14

## 2023-02-18 ENCOUNTER — HOSPITAL ENCOUNTER (EMERGENCY)
Facility: CLINIC | Age: 38
Discharge: HOME | End: 2023-02-18
Attending: EMERGENCY MEDICINE | Admitting: EMERGENCY MEDICINE
Payer: COMMERCIAL

## 2023-02-18 VITALS
TEMPERATURE: 97.7 F | HEART RATE: 84 BPM | OXYGEN SATURATION: 100 % | SYSTOLIC BLOOD PRESSURE: 127 MMHG | RESPIRATION RATE: 16 BRPM | DIASTOLIC BLOOD PRESSURE: 84 MMHG

## 2023-02-18 DIAGNOSIS — L03.112 CELLULITIS OF LEFT AXILLA: ICD-10-CM

## 2023-02-18 PROCEDURE — 250N000013 HC RX MED GY IP 250 OP 250 PS 637: Performed by: EMERGENCY MEDICINE

## 2023-02-18 PROCEDURE — 99284 EMERGENCY DEPT VISIT MOD MDM: CPT | Mod: 25

## 2023-02-18 PROCEDURE — 76882 US LMTD JT/FCL EVL NVASC XTR: CPT

## 2023-02-18 RX ORDER — DOXYCYCLINE 100 MG/1
100 CAPSULE ORAL 2 TIMES DAILY
Qty: 14 CAPSULE | Refills: 0 | Status: SHIPPED | OUTPATIENT
Start: 2023-02-18 | End: 2023-02-25

## 2023-02-18 RX ORDER — SULFAMETHOXAZOLE/TRIMETHOPRIM 800-160 MG
1 TABLET ORAL ONCE
Status: DISCONTINUED | OUTPATIENT
Start: 2023-02-18 | End: 2023-02-18

## 2023-02-18 RX ORDER — DOXYCYCLINE 100 MG/1
100 CAPSULE ORAL ONCE
Status: COMPLETED | OUTPATIENT
Start: 2023-02-18 | End: 2023-02-18

## 2023-02-18 RX ADMIN — DOXYCYCLINE HYCLATE 100 MG: 100 CAPSULE ORAL at 04:11

## 2023-02-18 ASSESSMENT — ENCOUNTER SYMPTOMS
VOMITING: 0
NAUSEA: 0
CHILLS: 0
FEVER: 0
WOUND: 1

## 2023-02-18 NOTE — ED TRIAGE NOTES
"Cyst noted to left arm pit on Monday. Pt has squeezing cyst and hot packed it, no improvement. Pt denies fevers. Pt states \"I just need to get it lanced\".      "

## 2023-02-18 NOTE — ED PROVIDER NOTES
History     Chief Complaint:  Wound Check       HPI   Daxa Wilkes is a 37 year old female who presents with left axillary pain. She noted pain on Monday evening. Tried to squeeze and drain and got minimal blood out. She felt this has kept expanding without significant drainage. 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      Independent Historian:    None - Patient Only    Review of External Notes: ED visit 4/13/2021 - left axillary abscess     ROS:  Review of Systems   Constitutional: Negative for chills and fever.   Gastrointestinal: Negative for nausea and vomiting.   Skin: Positive for wound.       Allergies:  Chicken-Derived Products (Egg)  Lac Bovis  Milk-Related Compounds  Penicillin G     Medications:    doxycycline hyclate (VIBRAMYCIN) 100 MG capsule  acetaminophen (TYLENOL) 325 MG tablet  albuterol (ALBUTEROL) 108 (90 BASE) MCG/ACT inhaler  buPROPion (WELLBUTRIN XL) 150 MG 24 hr tablet  ibuprofen (ADVIL/MOTRIN) 200 MG tablet  levonorgestrel (MIRENA) 20 MCG/24HR IUD  levothyroxine (SYNTHROID/LEVOTHROID) 112 MCG tablet  oxyCODONE (ROXICODONE) 5 MG tablet  ranitidine (ZANTAC) 150 MG tablet  senna-docusate (SENOKOT-S/PERICOLACE) 8.6-50 MG tablet  sertraline (ZOLOFT) 50 MG tablet        Past Medical History:   Past Surgical History:     Past Medical History:   Diagnosis Date     Depressive disorder      GERD (gastroesophageal reflux disease)      HELLP syndrome, delivered, current hospitalization 7/13/2016     PUD (peptic ulcer disease)      Thyroid disease      Vaginal delivery 7/11/2016    Past Surgical History:   Procedure Laterality Date     IRRIGATION AND DEBRIDEMENT TRUNK, COMBINED Right 7/29/2019    Procedure: Excisional debridement, right posterior shoulder abscess;  Surgeon: Meeta Hilton MD;  Location: [facility] OR     LAPAROSCOPIC CHOLECYSTECTOMY N/A 9/20/2019    Procedure: CHOLECYSTECTOMY, LAPAROSCOPIC;  Surgeon: Stacia Santana MD;  Location: [facility] OR      Patient Active  Addended by: KAILEY LEE on: 8/7/2018 04:23 PM     Modules accepted: Orders     Problem List    Diagnosis Date Noted     Shoulder abscess 07/29/2019     Priority: Medium     Cellulitis of right upper extremity 07/28/2019     Priority: Medium     Cellulitis 07/28/2019     Priority: Medium     HELLP syndrome, delivered, current hospitalization 07/13/2016     Priority: Medium     Vaginal delivery 07/11/2016     Priority: Medium     CARDIOVASCULAR SCREENING; LDL GOAL LESS THAN 160 10/31/2010     Priority: Medium          Family History:    family history includes Breast Cancer in her maternal grandmother; Thyroid Disease in her brother, father, maternal grandfather, paternal grandfather, paternal grandmother, and sister; Unknown/Adopted in her mother.    Social History:   reports that she has never smoked. She has never used smokeless tobacco. She reports that she does not drink alcohol and does not use drugs.  PCP: Jennifer West     Physical Exam     Patient Vitals for the past 24 hrs:   BP Temp Temp src Pulse Resp SpO2   02/18/23 0313 127/84 97.7  F (36.5  C) Temporal 84 16 100 %        Physical Exam    HEENT:  mmm. Normal phonation.  Eyes: PERRL B/L  CV: Peripheral pulses in tact and regular  Resp: Speaking in full sentences without any respiratory distress  Musculoskeletal:     Skin: Warm, dry, well perfused.  There is tenderness and induration without obvious fluctuance in the left axilla.  There is no gross overlying cellulitis noted.  Neuro: Alert, no gross motor or sensory deficits  Psych: Calm      Emergency Department Course     Imaging:  POC US SOFT TISSUE   Final Result   [facility] Procedure Note       Limited Bedside ED Ultrasound of Soft Tissue:      PROCEDURE: PERFORMED BY: Dr. Yang Graham,    INDICATIONS/SYMPTOM: Skin redness, evaluate for abscess, cellulitis or foreign body   PROBE: High frequency linear probe   BODY LOCATION: Soft tissue located on extremity       FINDINGS: Cobblestoning of soft tissue: present    Hypoechoic fluid (ie abscess) identified: absent        INTERPRETATION:  The soft tissue and muscle layers were evaluated.       Findings indicate cellulitis      IMAGE DOCUMENTATION: Images were not archived.            Report per myself    Emergency Department Course & Assessments:             Interventions:  Medications   doxycycline hyclate (VIBRAMYCIN) capsule 100 mg (has no administration in time range)        Assessments, Independent Interpretation, Consult/Discussion of ManagementTests:     ED Course as of 02/18/23 0405   Sat Feb 18, 2023   0340 Dr. Graham' evaluation.       Social Determinants of Health affecting care:  None       Disposition:  The patient was discharged to home.     Impression & Plan    CMS Diagnoses: None      Medical Decision Making:  This 37-year-old female patient presents to the ED due to left axillary pain.  Please see the HPI and exam for specifics.  Patient has a history of MRSA and prior abscesses in this location.  I used a bedside ultrasound and noted cobblestoning but no hypoechoic fluid collection that was amenable to incision and drainage.  I will plan on placing the patient on doxycycline and encouraging primary care follow-up but also encouraged the patient to talk with the primary care clinic about these recurrent abscesses and whether or not general surgery consultation may or may not be warranted.  The patient can return at anytime with new or worsening symptoms.    Critical Care time:  was 0 minutes for this patient excluding procedures.    Diagnosis:    ICD-10-CM    1. Cellulitis of left axilla  L03.112            Discharge Medications:  New Prescriptions    DOXYCYCLINE HYCLATE (VIBRAMYCIN) 100 MG CAPSULE    Take 1 capsule (100 mg) by mouth 2 times daily for 7 days          2/18/2023   Yang Graham DO Burns, Bradley Joseph, DO  02/18/23 0406

## 2023-02-18 NOTE — DISCHARGE INSTRUCTIONS
What do you do next:   Continue your home medications unless we have specifically changed them  Take the antibiotics I have prescribed as directed.  Continue to use warm compresses in your left armpit.  You can use over-the-counter acetaminophen (Tylenol ) and ibuprofen for pain control for the next 2 to 3 days.  Acetaminophen (Tylenol): Take 500 to 1000 mg by mouth every 6 hours as needed for fever or pain.  Do not take more than 4000 total milligrams of acetaminophen-containing products in a 24-hour timeframe.  Ibuprofen: Take 600 milligrams by mouth every 6-8 hours as needed for fever or pain.  Take this with food or milk to avoid stomach upset.  Follow up as indicated below    When do you return: If you have uncontrolled fevers, uncontrollable pain, rapidly spreading redness around the area of concern in your armpit, or any other symptoms that concern you, please return to the ED for reevaluation.    Thank you for allowing us to care for you today.

## 2023-04-23 ENCOUNTER — HEALTH MAINTENANCE LETTER (OUTPATIENT)
Age: 38
End: 2023-04-23

## 2024-06-30 ENCOUNTER — HEALTH MAINTENANCE LETTER (OUTPATIENT)
Age: 39
End: 2024-06-30

## 2024-11-17 ENCOUNTER — HEALTH MAINTENANCE LETTER (OUTPATIENT)
Age: 39
End: 2024-11-17

## 2025-07-13 ENCOUNTER — HEALTH MAINTENANCE LETTER (OUTPATIENT)
Age: 40
End: 2025-07-13

## (undated) DEVICE — ESU PENCIL W/HOLSTER E2350H

## (undated) DEVICE — LINEN TOWEL PACK X10 5473

## (undated) DEVICE — LINEN POUCH DBL 5427

## (undated) DEVICE — SUCTION IRR STRYKERFLOW II W/TIP 250-070-520

## (undated) DEVICE — LINEN FULL SHEET 5511

## (undated) DEVICE — GLOVE PROTEXIS POWDER FREE 6.5 ORTHOPEDIC 2D73ET65

## (undated) DEVICE — SOL NACL 0.9% IRRIG 1000ML BOTTLE 07138-09

## (undated) DEVICE — ENDO TROCAR FIRST ENTRY KII FIOS Z-THRD 05X100MM CTF03

## (undated) DEVICE — ENDO TROCAR OPTICAL ACCESS KII Z-THRD 12X100MM C0R85

## (undated) DEVICE — LINEN HALF SHEET 5512

## (undated) DEVICE — TUBE CULTURE ANAEROBIC PORT-A-CUL 11ML

## (undated) DEVICE — ESU GROUND PAD ADULT W/CORD E7507

## (undated) DEVICE — ESU ELEC BLADE 2.75" COATED/INSULATED E1455

## (undated) DEVICE — SU VICRYL 0 UR-6 27" J603H

## (undated) DEVICE — PACK MINOR CUSTOM RIDGES SBA32RMRMA

## (undated) DEVICE — SYR 03ML LL W/O NDL 309657

## (undated) DEVICE — PREFILTER SMOKE EVAC E6330

## (undated) DEVICE — SOL NACL 0.9% IRRIG 3000ML BAG 2B7477

## (undated) DEVICE — ESU CORD MONOPOLAR 10'  E0510

## (undated) DEVICE — PREP POVIDONE IODINE SCRUB 7.5% 4OZ APL82212

## (undated) DEVICE — Device

## (undated) DEVICE — DRAPE LAP W/ARMBOARD 29410

## (undated) DEVICE — BAG CLEAR TRASH 1.3M 39X33" P4040C

## (undated) DEVICE — SU VICRYL 4-0 PS-2 18" UND J496H

## (undated) DEVICE — GLOVE PROTEXIS BLUE W/NEU-THERA 6.5  2D73EB65

## (undated) DEVICE — DRSG GAUZE 4X4" 8044

## (undated) DEVICE — PACKING NUGAUZE 1" PLAIN 7633

## (undated) DEVICE — PREP SCRUB SOL EXIDINE 4% CHG 4OZ 29002-404

## (undated) DEVICE — SUCTION CANISTER MEDIVAC LINER 3000ML W/LID 65651-530

## (undated) DEVICE — PREP POVIDONE IODINE SOLUTION 10% 4OZ

## (undated) DEVICE — GLOVE PROTEXIS W/NEU-THERA 6.5  2D73TE65

## (undated) DEVICE — NDL BLUNT 18GA 1" W/O FILTER 305181

## (undated) DEVICE — PREP SKIN SCRUB TRAY 4461A

## (undated) DEVICE — SYR EAR BULB 3OZ 0035830

## (undated) RX ORDER — FENTANYL CITRATE 50 UG/ML
INJECTION, SOLUTION INTRAMUSCULAR; INTRAVENOUS
Status: DISPENSED
Start: 2019-09-20

## (undated) RX ORDER — FENTANYL CITRATE 50 UG/ML
INJECTION, SOLUTION INTRAMUSCULAR; INTRAVENOUS
Status: DISPENSED
Start: 2019-07-29

## (undated) RX ORDER — ONDANSETRON 2 MG/ML
INJECTION INTRAMUSCULAR; INTRAVENOUS
Status: DISPENSED
Start: 2019-09-20

## (undated) RX ORDER — HYDROMORPHONE HYDROCHLORIDE 1 MG/ML
INJECTION, SOLUTION INTRAMUSCULAR; INTRAVENOUS; SUBCUTANEOUS
Status: DISPENSED
Start: 2019-09-20

## (undated) RX ORDER — OXYCODONE HYDROCHLORIDE 5 MG/1
TABLET ORAL
Status: DISPENSED
Start: 2019-09-20

## (undated) RX ORDER — LIDOCAINE HYDROCHLORIDE 10 MG/ML
INJECTION, SOLUTION EPIDURAL; INFILTRATION; INTRACAUDAL; PERINEURAL
Status: DISPENSED
Start: 2019-07-29

## (undated) RX ORDER — DEXAMETHASONE SODIUM PHOSPHATE 4 MG/ML
INJECTION, SOLUTION INTRA-ARTICULAR; INTRALESIONAL; INTRAMUSCULAR; INTRAVENOUS; SOFT TISSUE
Status: DISPENSED
Start: 2019-09-20

## (undated) RX ORDER — LIDOCAINE HYDROCHLORIDE 20 MG/ML
INJECTION, SOLUTION EPIDURAL; INFILTRATION; INTRACAUDAL; PERINEURAL
Status: DISPENSED
Start: 2019-09-20

## (undated) RX ORDER — GLYCOPYRROLATE 0.2 MG/ML
INJECTION INTRAMUSCULAR; INTRAVENOUS
Status: DISPENSED
Start: 2019-07-29

## (undated) RX ORDER — GLYCOPYRROLATE 0.2 MG/ML
INJECTION INTRAMUSCULAR; INTRAVENOUS
Status: DISPENSED
Start: 2019-09-20

## (undated) RX ORDER — CEFAZOLIN SODIUM 2 G/100ML
INJECTION, SOLUTION INTRAVENOUS
Status: DISPENSED
Start: 2019-09-20

## (undated) RX ORDER — PROPOFOL 10 MG/ML
INJECTION, EMULSION INTRAVENOUS
Status: DISPENSED
Start: 2019-09-20

## (undated) RX ORDER — BUPIVACAINE HYDROCHLORIDE 2.5 MG/ML
INJECTION, SOLUTION EPIDURAL; INFILTRATION; INTRACAUDAL
Status: DISPENSED
Start: 2019-07-29

## (undated) RX ORDER — BUPIVACAINE HYDROCHLORIDE 5 MG/ML
INJECTION, SOLUTION EPIDURAL; INTRACAUDAL
Status: DISPENSED
Start: 2019-09-20